# Patient Record
Sex: MALE | Race: WHITE | Employment: OTHER | URBAN - METROPOLITAN AREA
[De-identification: names, ages, dates, MRNs, and addresses within clinical notes are randomized per-mention and may not be internally consistent; named-entity substitution may affect disease eponyms.]

---

## 2017-06-14 ENCOUNTER — OFFICE VISIT (OUTPATIENT)
Dept: CARDIOLOGY CLINIC | Age: 62
End: 2017-06-14

## 2017-06-14 VITALS
DIASTOLIC BLOOD PRESSURE: 60 MMHG | HEART RATE: 66 BPM | SYSTOLIC BLOOD PRESSURE: 124 MMHG | BODY MASS INDEX: 32.62 KG/M2 | HEIGHT: 71 IN | WEIGHT: 233 LBS

## 2017-06-14 DIAGNOSIS — R00.2 PALPITATION: ICD-10-CM

## 2017-06-14 DIAGNOSIS — Z82.49 FH: CAD (CORONARY ARTERY DISEASE): ICD-10-CM

## 2017-06-14 DIAGNOSIS — E78.5 HYPERLIPIDEMIA, UNSPECIFIED HYPERLIPIDEMIA TYPE: Primary | ICD-10-CM

## 2017-06-14 DIAGNOSIS — I25.10 CORONARY ARTERY DISEASE INVOLVING NATIVE CORONARY ARTERY OF NATIVE HEART WITHOUT ANGINA PECTORIS: ICD-10-CM

## 2017-06-14 PROCEDURE — 3017F COLORECTAL CA SCREEN DOC REV: CPT | Performed by: INTERNAL MEDICINE

## 2017-06-14 PROCEDURE — G8598 ASA/ANTIPLAT THER USED: HCPCS | Performed by: INTERNAL MEDICINE

## 2017-06-14 PROCEDURE — 99214 OFFICE O/P EST MOD 30 MIN: CPT | Performed by: INTERNAL MEDICINE

## 2017-06-14 PROCEDURE — G8427 DOCREV CUR MEDS BY ELIG CLIN: HCPCS | Performed by: INTERNAL MEDICINE

## 2017-06-14 PROCEDURE — G8419 CALC BMI OUT NRM PARAM NOF/U: HCPCS | Performed by: INTERNAL MEDICINE

## 2017-06-14 PROCEDURE — 1036F TOBACCO NON-USER: CPT | Performed by: INTERNAL MEDICINE

## 2018-07-25 ENCOUNTER — OFFICE VISIT (OUTPATIENT)
Dept: CARDIOLOGY CLINIC | Age: 63
End: 2018-07-25

## 2018-07-25 VITALS
DIASTOLIC BLOOD PRESSURE: 84 MMHG | SYSTOLIC BLOOD PRESSURE: 136 MMHG | WEIGHT: 231 LBS | BODY MASS INDEX: 32.34 KG/M2 | HEART RATE: 62 BPM | HEIGHT: 71 IN

## 2018-07-25 DIAGNOSIS — R00.2 PALPITATION: Primary | ICD-10-CM

## 2018-07-25 DIAGNOSIS — I25.10 CORONARY ARTERY DISEASE INVOLVING NATIVE CORONARY ARTERY OF NATIVE HEART WITHOUT ANGINA PECTORIS: ICD-10-CM

## 2018-07-25 PROCEDURE — 3017F COLORECTAL CA SCREEN DOC REV: CPT | Performed by: INTERNAL MEDICINE

## 2018-07-25 PROCEDURE — 1036F TOBACCO NON-USER: CPT | Performed by: INTERNAL MEDICINE

## 2018-07-25 PROCEDURE — 99214 OFFICE O/P EST MOD 30 MIN: CPT | Performed by: INTERNAL MEDICINE

## 2018-07-25 PROCEDURE — 93000 ELECTROCARDIOGRAM COMPLETE: CPT | Performed by: INTERNAL MEDICINE

## 2018-07-25 PROCEDURE — G8417 CALC BMI ABV UP PARAM F/U: HCPCS | Performed by: INTERNAL MEDICINE

## 2018-07-25 PROCEDURE — G8427 DOCREV CUR MEDS BY ELIG CLIN: HCPCS | Performed by: INTERNAL MEDICINE

## 2018-07-25 PROCEDURE — G8598 ASA/ANTIPLAT THER USED: HCPCS | Performed by: INTERNAL MEDICINE

## 2018-07-25 NOTE — PROGRESS NOTES
CARDIOLOGY NOTE      7/25/2018    RE: Shannan Carbone  ([birth year])                               TO:  Dr. Nico Fall MD      Thank you for involving me in taking care of your  patient Shannan Carbone, who is a  59y.o. year old      male with past medical  history of  Cad, hyperlipidimea  is  seen today Patient  during this  visit c/o more fluttering in chest than usual , no CP, SOB, for a few months. .      Vitals:    07/25/18 0820   BP: 136/84   Pulse: 62       Current Outpatient Prescriptions   Medication Sig Dispense Refill    escitalopram (LEXAPRO) 10 MG tablet Take 10 mg by mouth daily.  rosuvastatin (CRESTOR) 20 MG tablet Take 1 tablet by mouth daily. 90 tablet 3    aspirin 325 MG tablet Take 325 mg by mouth daily. No current facility-administered medications for this visit. Allergies: Patient has no known allergies. Past Medical History:   Diagnosis Date    CAD (coronary artery disease) 10/08    Per cardiac cath.     H/O 24 hour EKG monitoring 7/18/12    WNL    H/O cardiac catheterization 10/08    Moderate disease of the distal LAD     H/O cardiovascular stress test 12/2/10    WNL EF 63%    H/O echocardiogram 12/1/09    WNL EF 55%    H/O echocardiogram 04/04/2014    EF 60%, trace mitral and tricuspid insufficiencies, estimated RV systolic pressure is 33.6 mmHg, no pericardial effusion    H/O: depression     Hyperlipidemia     Palpitation     Sleep apnea      Past Surgical History:   Procedure Laterality Date    CARPAL TUNNEL RELEASE  3/08    COLONOSCOPY  4-26-13    diverticulosis    COLONOSCOPY  2013    DIAGNOSTIC CARDIAC CATH LAB PROCEDURE  10/07/08    Moderate disease of the distal LAD    TONSILLECTOMY       Family History   Problem Relation Age of Onset    Cancer Father     Heart Disease Brother      Social History   Substance Use Topics    Smoking status: Never Smoker    Smokeless tobacco: Never Used      Comment: reviewed 5/22/15    Alcohol use

## 2018-07-26 ENCOUNTER — NURSE ONLY (OUTPATIENT)
Dept: CARDIOLOGY CLINIC | Age: 63
End: 2018-07-26

## 2018-07-26 DIAGNOSIS — R00.2 PALPITATIONS: Primary | ICD-10-CM

## 2018-07-26 PROCEDURE — 93228 REMOTE 30 DAY ECG REV/REPORT: CPT | Performed by: INTERNAL MEDICINE

## 2018-08-13 ENCOUNTER — PROCEDURE VISIT (OUTPATIENT)
Dept: CARDIOLOGY CLINIC | Age: 63
End: 2018-08-13

## 2018-08-13 DIAGNOSIS — I25.10 CORONARY ARTERY DISEASE INVOLVING NATIVE CORONARY ARTERY OF NATIVE HEART WITHOUT ANGINA PECTORIS: ICD-10-CM

## 2018-08-13 DIAGNOSIS — R00.2 PALPITATION: ICD-10-CM

## 2018-08-13 PROCEDURE — 93015 CV STRESS TEST SUPVJ I&R: CPT | Performed by: INTERNAL MEDICINE

## 2018-08-17 DIAGNOSIS — I25.10 CORONARY ARTERY DISEASE INVOLVING NATIVE CORONARY ARTERY OF NATIVE HEART WITHOUT ANGINA PECTORIS: ICD-10-CM

## 2018-08-17 DIAGNOSIS — R00.2 PALPITATION: ICD-10-CM

## 2019-06-13 LAB
ALBUMIN SERPL-MCNC: 4.8 G/DL
ALP BLD-CCNC: 83 U/L
ALT SERPL-CCNC: 39 U/L
ANION GAP SERPL CALCULATED.3IONS-SCNC: NORMAL MMOL/L
AST SERPL-CCNC: 32 U/L
BASOPHILS ABSOLUTE: 0.1 /ΜL
BASOPHILS RELATIVE PERCENT: 1 %
BILIRUB SERPL-MCNC: 0.9 MG/DL (ref 0.1–1.4)
BUN BLDV-MCNC: 18 MG/DL
CALCIUM SERPL-MCNC: 9.9 MG/DL
CHLORIDE BLD-SCNC: 103 MMOL/L
CHOLESTEROL, TOTAL: 180 MG/DL
CHOLESTEROL/HDL RATIO: NORMAL
CO2: 21 MMOL/L
CREAT SERPL-MCNC: 0.98 MG/DL
EOSINOPHILS ABSOLUTE: 0.3 /ΜL
EOSINOPHILS RELATIVE PERCENT: 4 %
GFR CALCULATED: NORMAL
GLUCOSE BLD-MCNC: 106 MG/DL
HCT VFR BLD CALC: 44.6 % (ref 41–53)
HDLC SERPL-MCNC: 43 MG/DL (ref 35–70)
HEMOGLOBIN: 15.1 G/DL (ref 13.5–17.5)
LDL CHOLESTEROL CALCULATED: 91 MG/DL (ref 0–160)
LYMPHOCYTES ABSOLUTE: 1.9 /ΜL
LYMPHOCYTES RELATIVE PERCENT: 20 %
MCH RBC QN AUTO: 27.9 PG
MCHC RBC AUTO-ENTMCNC: 33.9 G/DL
MCV RBC AUTO: 82 FL
MONOCYTES ABSOLUTE: 0.6 /ΜL
MONOCYTES RELATIVE PERCENT: 6 %
NEUTROPHILS ABSOLUTE: 6.6 /ΜL
NEUTROPHILS RELATIVE PERCENT: 69 %
PLATELET # BLD: 275 K/ΜL
PMV BLD AUTO: NORMAL FL
POTASSIUM SERPL-SCNC: 4.6 MMOL/L
RBC # BLD: 5.42 10^6/ΜL
SODIUM BLD-SCNC: 138 MMOL/L
TOTAL PROTEIN: 7.7
TRIGL SERPL-MCNC: 230 MG/DL
VLDLC SERPL CALC-MCNC: 46 MG/DL
WBC # BLD: 9.5 10^3/ML

## 2019-07-24 ENCOUNTER — OFFICE VISIT (OUTPATIENT)
Dept: CARDIOLOGY CLINIC | Age: 64
End: 2019-07-24
Payer: COMMERCIAL

## 2019-07-24 VITALS
HEART RATE: 62 BPM | DIASTOLIC BLOOD PRESSURE: 78 MMHG | SYSTOLIC BLOOD PRESSURE: 116 MMHG | BODY MASS INDEX: 32.51 KG/M2 | HEIGHT: 71 IN | WEIGHT: 232.2 LBS

## 2019-07-24 DIAGNOSIS — E78.5 HYPERLIPIDEMIA, UNSPECIFIED HYPERLIPIDEMIA TYPE: Primary | ICD-10-CM

## 2019-07-24 DIAGNOSIS — R00.2 PALPITATION: ICD-10-CM

## 2019-07-24 DIAGNOSIS — I25.10 CORONARY ARTERY DISEASE INVOLVING NATIVE CORONARY ARTERY OF NATIVE HEART WITHOUT ANGINA PECTORIS: ICD-10-CM

## 2019-07-24 PROCEDURE — 99212 OFFICE O/P EST SF 10 MIN: CPT | Performed by: NURSE PRACTITIONER

## 2019-07-24 ASSESSMENT — ENCOUNTER SYMPTOMS
ABDOMINAL DISTENTION: 0
VOMITING: 0
COLOR CHANGE: 0
EYE ITCHING: 0
DIARRHEA: 0
BACK PAIN: 0
SORE THROAT: 0
SHORTNESS OF BREATH: 0
CONSTIPATION: 0
EYE REDNESS: 0
SINUS PRESSURE: 0
NAUSEA: 0
ABDOMINAL PAIN: 0
CHEST TIGHTNESS: 0

## 2020-03-12 ENCOUNTER — HOSPITAL ENCOUNTER (OUTPATIENT)
Dept: DIABETES SERVICES | Age: 65
Setting detail: THERAPIES SERIES
Discharge: HOME | End: 2020-03-12
Payer: COMMERCIAL

## 2020-03-12 PROCEDURE — G0109 DIAB MANAGE TRN IND/GROUP: HCPCS

## 2020-03-12 SDOH — ECONOMIC STABILITY: FOOD INSECURITY: ADDITIONAL INFORMATION: NO

## 2020-03-13 VITALS — HEIGHT: 71 IN | WEIGHT: 232 LBS | BODY MASS INDEX: 32.48 KG/M2

## 2020-03-13 NOTE — PROGRESS NOTES
proper technique; safe needle disposal guidelines. 03-  1 03- 03-  3 Diet control   Monitoring blood glucose, interpreting and using results:  Identify recommended & personal blood glucose targets; importance of testing; testing supplies; HgbA1C target levels; Factors affecting blood glucose; Importance of logging blood glucose levels for pattern recognition; ketone testing; safe lancet disposal. 03-  1 03- 03-  3 No results found for: LABA1C   Monitors 3 times a week   -140   Prevention, detection & treatment of acute complications:  Identify symptoms of hyper & hypoglycemia, ketosis and prevention & treatment strategies. Describe sick day guidelines & indications for ketone testing & physician notification. Identify short term consequences of poor control. Severe weather or situation crisis and diabetes supplies management. 03-  1 03- 03-  3 Denies symptoms of low or high BG   Prevention, detection & treatment of chronic complications:  Define the natural course of diabetes & describe the relationship of blood glucose levels to long term complications of diabetes. Identify preventative measures & standards of care. Immunizations and preventative eye, foot, dental and renal examinations as indicated per the individual participant's duration of Diabetes and health status. 03-  1 03- 03-  3 Needs eye exam and not doing daily foot exam  Dental every 6 month   Developing strategies to address psychosocial issues:  Describe feelings about living with diabetes; Describe how stress, depression & anxiety affect blood glucose; Identify coping strategies; Identify support needed & support network available. 03-  1 03- 03-  3 Plans to FU with PCP about depression   Developing strategies to promote health/change behavior: Identify 7 self-care behaviors; Personal health risk factors;  Benefits,

## 2020-07-22 ENCOUNTER — TELEPHONE (OUTPATIENT)
Dept: CARDIOLOGY CLINIC | Age: 65
End: 2020-07-22

## 2020-07-27 ENCOUNTER — TELEMEDICINE (OUTPATIENT)
Dept: CARDIOLOGY CLINIC | Age: 65
End: 2020-07-27
Payer: COMMERCIAL

## 2020-07-27 PROCEDURE — 3017F COLORECTAL CA SCREEN DOC REV: CPT | Performed by: NURSE PRACTITIONER

## 2020-07-27 PROCEDURE — G8427 DOCREV CUR MEDS BY ELIG CLIN: HCPCS | Performed by: NURSE PRACTITIONER

## 2020-07-27 PROCEDURE — 99212 OFFICE O/P EST SF 10 MIN: CPT | Performed by: NURSE PRACTITIONER

## 2020-07-27 ASSESSMENT — ENCOUNTER SYMPTOMS
COLOR CHANGE: 0
SINUS PAIN: 0
EYE ITCHING: 0
CHEST TIGHTNESS: 0
SHORTNESS OF BREATH: 0
SINUS PRESSURE: 0
ABDOMINAL DISTENTION: 0
DIARRHEA: 0
ABDOMINAL PAIN: 0
BACK PAIN: 0
BLOOD IN STOOL: 0
VOMITING: 0
NAUSEA: 0
CONSTIPATION: 0
EYE REDNESS: 1

## 2020-07-27 NOTE — PROGRESS NOTES
2020    TELEHEALTH EVALUATION -- Audio/Visual (During BZFJD-30 public health emergency)    HPI:     Yulisa Grimaldo (:  1955) has requested an audio/video evaluation for the following concern(s):  History of coronary artery disease and hyperlipidemia. Patient reports he has been feeling well since his last office visit 1 year ago. He reports there has been no change in his health history. He reports that he is very active. He exercises daily. He recently started the keto diet. He has lost 4 pounds. He denies chest pain palpitations shortness of breath lightheadedness dizziness syncope or edema. He is able to take his blood pressure at home. He has had labs drawn with Dr. Napoleon Quintero within the last 6 months. He sees Dr. Napoleon Quintero 2 times a year. Review of Systems   Constitutional: Negative for activity change, appetite change and fatigue. HENT: Negative for congestion, sinus pressure and sinus pain. Eyes: Positive for redness. Negative for itching. Respiratory: Negative for chest tightness and shortness of breath. Cardiovascular: Negative for chest pain, palpitations and leg swelling. Gastrointestinal: Negative for abdominal distention, abdominal pain, blood in stool, constipation, diarrhea, nausea and vomiting. Genitourinary: Negative for difficulty urinating and dysuria. Musculoskeletal: Positive for arthralgias. Negative for back pain and gait problem. Skin: Negative for color change, pallor and rash. Neurological: Negative for dizziness, syncope, speech difficulty and light-headedness. Psychiatric/Behavioral: Negative for confusion. The patient is not nervous/anxious. Prior to Visit Medications    Medication Sig Taking? Authorizing Provider   escitalopram (LEXAPRO) 10 MG tablet Take 10 mg by mouth daily. Yes Historical Provider, MD   rosuvastatin (CRESTOR) 20 MG tablet Take 1 tablet by mouth daily.  Yes Crow Gaytan MD   aspirin 325 MG tablet Take 325 mg by mouth daily. Yes Historical Provider, MD       Social History     Tobacco Use    Smoking status: Never Smoker    Smokeless tobacco: Never Used    Tobacco comment: reviewed 5/22/15   Substance Use Topics    Alcohol use: No    Drug use: No          Patient-Reported Vitals 7/27/2020   Patient-Reported Weight 117lb   Patient-Reported Height 5'11   Patient-Reported Systolic 222   Patient-Reported Diastolic 79   Patient-Reported Pulse 77       PHYSICAL EXAMINATION:  [ INSTRUCTIONS:  \"[x]\" Indicates a positive item  \"[]\" Indicates a negative item  -- DELETE ALL ITEMS NOT EXAMINED]  Vital Signs: (As obtained by patient/caregiver or practitioner observation)  Patient-Reported Vitals 7/27/2020   Patient-Reported Weight 117lb   Patient-Reported Height 5'11   Patient-Reported Systolic 620   Patient-Reported Diastolic 79   Patient-Reported Pulse 77         Constitutional: [x] Appears well-developed and well-nourished [x] No apparent distress      [] Abnormal-   Mental status  [x] Alert and awake  [x] Oriented to person/place/time [x]Able to follow commands      Eyes:  EOM    [x]  Normal  [] Abnormal-  Sclera  [x]  Normal  [] Abnormal -         Discharge [x]  None visible  [] Abnormal -    HENT:   [x] Normocephalic, atraumatic.   [] Abnormal   [x] Mouth/Throat: Mucous membranes are moist.     External Ears [x] Normal  [] Abnormal-     Neck: [x] No visualized mass     Pulmonary/Chest: [x] Respiratory effort normal.  [x] No visualized signs of difficulty breathing or respiratory distress        [] Abnormal-      Musculoskeletal:   [x] Normal gait with no signs of ataxia         [x] Normal range of motion of neck        [] Abnormal-       Neurological:        [x] No Facial Asymmetry (Cranial nerve 7 motor function) (limited exam to video visit)          [x] No gaze palsy        [] Abnormal-         Skin:        [x] No significant exanthematous lesions or discoloration noted on facial skin         [] Abnormal- Psychiatric:       [x] Normal Affect [x] No Hallucinations        [] Abnormal-     Other pertinent observable physical exam findings-     Due to this being a TeleHealth encounter, evaluation of the following organ systems is limited: Vitals/Constitutional/EENT/Resp/CV/GI//MS/Neuro/Skin/Heme-Lymph-Imm. ASSESSMENT/PLAN:    CAD  Stable  Denies cardiac complaints  Last stress test 8/13/2018 reviewed  No ischemia noted  No change in current medical management  Blood pressure stable    Hyperlipidemia  He is on Crestor  He does follow with Dr. Luis Enrique Hernandez office every 6 months  He states he recently had labs  We will attempt to obtain labs from Dr. Luis Enrique Hernandez office    Palpitations  Resolved    Recommend patient to follow-up with Dr. Maxim Ray in 1 year      An  electronic signature was used to authenticate this note. --KENYA Green - CNP on 7/27/2020 at 9:03 AM    119}    I confirm that this visit was completed in a telehealth setting ,using synchronous audiovisual technology for real time patient interaction . The patient identity with name and date of birth was confirmed . This evaluation of patient was done by telehealth in the setting of [address] , which precluded assurance of safe in person visit at the time of service. The patient consented to and accepts potential risks associated with telemedical evaluation and care was taken to assess alvarez presence of any medical issues that would be more  appropriate for expedited in -person care. Pursuant to the emergency declaration under the [facility] waiver authority and the [employer] Act, this Virtual  Visit was conducted, with patient's consent, to reduce the patient's risk of exposure to COVID-19 and provide continuity of care for an established patient.     Services were provided through a video synchronous discussion virtually to substitute for in-person clinic visit. I Affirm this is a Patient Initiated Episode with an Established Patient who has not had a related appointment within my department in the past 7 days or scheduled within the next 24 hours.     Total Time: minutes: 5-10 minutes

## 2020-07-27 NOTE — LETTER
Jocelin Reid  1955  [id number]    Have you had any Chest Pain - No      Have you had any Shortness of Breath - No      Have you had any dizziness - No      Have you had any palpitations - NO      Is the patient on any of the following medications - none  If Yes DO EKG    Do you have any edema - no    Do you have a surgery or procedure scheduled in the near future - No      Ask patient if they [facility] to [facility] if they are not already signed up    Check to see if we have an E-MAIL on file for the patient    Check medication list thoroughly!!!  BE SURE TO ASK PATIENT IF THEY NEED MEDICATION REFILLS

## 2021-06-30 ENCOUNTER — OFFICE VISIT (OUTPATIENT)
Dept: CARDIOLOGY CLINIC | Age: 66
End: 2021-06-30
Payer: COMMERCIAL

## 2021-06-30 VITALS
BODY MASS INDEX: 32.81 KG/M2 | HEART RATE: 72 BPM | WEIGHT: 234.4 LBS | SYSTOLIC BLOOD PRESSURE: 120 MMHG | DIASTOLIC BLOOD PRESSURE: 82 MMHG | HEIGHT: 71 IN

## 2021-06-30 DIAGNOSIS — I25.10 CORONARY ARTERY DISEASE INVOLVING NATIVE CORONARY ARTERY OF NATIVE HEART WITHOUT ANGINA PECTORIS: Primary | ICD-10-CM

## 2021-06-30 DIAGNOSIS — E78.5 HYPERLIPIDEMIA, UNSPECIFIED HYPERLIPIDEMIA TYPE: ICD-10-CM

## 2021-06-30 PROCEDURE — 99203 OFFICE O/P NEW LOW 30 MIN: CPT | Performed by: NURSE PRACTITIONER

## 2021-06-30 PROCEDURE — 93000 ELECTROCARDIOGRAM COMPLETE: CPT | Performed by: NURSE PRACTITIONER

## 2021-06-30 ASSESSMENT — ENCOUNTER SYMPTOMS
ORTHOPNEA: 0
SHORTNESS OF BREATH: 0

## 2021-06-30 NOTE — LETTER
Barbara Turk  [birth year]  [id number]    Have you had any Chest Pain that is not new? - No         Have you had any Shortness of Breath - No  If Yes - When       Have you had any dizziness - No       Have you had any palpitations that are not new? - No       Do you have any edema - swelling in No    If Yes - CHECK TO SEE IF THE EDEMA IS PITTING      When did you have your last labs drawn 3/2021  Where did you have them done Dr. Je Pemberton  What doctor ordered Dr. Je Pemberton    If we do not have these labs you are retrieve these labs for these providers!     Do you have a surgery or procedure scheduled in the near future - No  If Yes- DO EKG

## 2021-06-30 NOTE — PROGRESS NOTES
6/30/2021  Primary cardiologist: Dr. Kimi Price  is an established 73 y.o.  male here for follow-up on coronary artery disease and hyperlipidemia       SUBJECTIVE/OBJECTIVE:  HPI : Karen Goetz is a 43-year-old gentleman with a past medical history of CAD, hyperlipidemia, sleep apnea and palpitations. In 2008 he underwent a LHC that demonstrated moderate disease of the LAD. Karen Goetz reports he is feeling well. He retired in November and is enjoying life. He is hiking three times a week. He denies chest pain or shortness of breath. Review of Systems   Constitutional: Negative for diaphoresis and malaise/fatigue. Cardiovascular: Negative for chest pain, claudication, dyspnea on exertion, irregular heartbeat, leg swelling, near-syncope, orthopnea, palpitations and paroxysmal nocturnal dyspnea. Respiratory: Negative for shortness of breath. Neurological: Negative for dizziness and light-headedness. Vitals:    06/30/21 1026   BP: 120/82   Site: Right Upper Arm   Position: Sitting   Cuff Size: Large Adult   Pulse: 72   Weight: 234 lb 6.4 oz (106.3 kg)   Height: 5' 11\" (1.803 m)     Patient-Reported Vitals 7/27/2020   Patient-Reported Weight 117lb   Patient-Reported Height 5'11   Patient-Reported Systolic 563   Patient-Reported Diastolic 79   Patient-Reported Pulse 77     Wt Readings from Last 3 Encounters:   06/30/21 234 lb 6.4 oz (106.3 kg)   03/13/20 232 lb (105.2 kg)   07/24/19 232 lb 3.2 oz (105.3 kg)     Body mass index is 32.69 kg/m². Physical Exam  Constitutional:       Appearance: Normal appearance. HENT:      Head: Normocephalic and atraumatic. Eyes:      Extraocular Movements: Extraocular movements intact. Pupils: Pupils are equal, round, and reactive to light. Cardiovascular:      Rate and Rhythm: Normal rate and regular rhythm. Pulses: Normal pulses. Pulmonary:      Effort: Pulmonary effort is normal.      Breath sounds: Normal breath sounds.    Abdominal: Joint Injections: Care Instructions Your Care Instructions Joint injections are shots into a joint, such as the knee. They may be used to put in medicines, such as pain relievers. A corticosteroid, or steroid, shot is used to reduce inflammation in tendons or joints. It is often used to treat problems such as arthritis, tendinitis, and bursitis. Steroids can be injected directly into a painful, inflamed joint. They can also help reduce inflammation of a bursa. A bursa is a sac of fluid. It cushions and lubricates areas where tendons, ligaments, skin, muscles, or bones rub against each other. A steroid shot can sometimes help with short-term pain relief when other treatments haven't worked. If steroid shots help, pain may improve for weeks or months. Follow-up care is a key part of your treatment and safety. Be sure to make and go to all appointments, and call your doctor if you are having problems. It's also a good idea to know your test results and keep a list of the medicines you take. How can you care for yourself at home? · Put ice or a cold pack on the area for 10 to 20 minutes at a time. Put a thin cloth between the ice and your skin. · Ask your doctor if you can take an over-the-counter pain medicine, such as acetaminophen (Tylenol), ibuprofen (Advil, Motrin), or naproxen (Aleve). Be safe with medicines. Read and follow all instructions on the label. · Avoid strenuous activities for several days. In particular, avoid ones that put stress on the area where you got the shot. · If you have dressings over the area, keep them clean and dry. You may remove them when your doctor tells you to. When should you call for help? Call your doctor now or seek immediate medical care if: 
  · You have signs of infection, such as: 
? Increased pain, swelling, warmth, or redness. ? Red streaks leading from the site. ? Pus draining from the site. ? A fever. General: There is no distension. Palpations: Abdomen is soft. Tenderness: There is no abdominal tenderness. Musculoskeletal:         General: Normal range of motion. Cervical back: Normal range of motion and neck supple. Right lower leg: No edema. Left lower leg: No edema. Skin:     General: Skin is warm. Capillary Refill: Capillary refill takes less than 2 seconds. Neurological:      General: No focal deficit present. Mental Status: He is alert and oriented to person, place, and time. Psychiatric:         Mood and Affect: Mood normal.         Behavior: Behavior normal.            All pertinent data reviewed and discussed with patient          ASSESSMENT/PLAN:    Coronary artery disease  Moderate disease of LAD   Clinically stable- denies any symptoms from CAD   Discussed aggressive risk factor modification including diet, good blood pressure control, and lipid management  A diet emphasizing intake of vegetables, fruits, legumes, nuts, whole grains, and fish is recommended to decrease ASCVD risk factors. Recommend to continue with ASA  EKG SR -    Dyslipidemia   No recent lipids available - has stopped crestor d/t myalgia   Goals for lipids reviewed  Discussed healthy eating habits including low fat -low cholesterol diet  continue to engage in healthy eating and exercise      Medications reviewed and confirmed with patient     Tests ordered:  none    OV in 1 year     Signed:  KENYA Lafleur CNP, 6/30/2021, 10:32 AM    An electronic signature was used to authenticate this note.  Watch closely for changes in your health, and be sure to contact your doctor if you have any problems. Where can you learn more? Go to http://elijah-reagan.info/. Enter N616 in the search box to learn more about \"Joint Injections: Care Instructions. \" Current as of: November 29, 2017 Content Version: 11.8 © 1121-9370 [employer]. Care instructions adapted under license by [employer] (which disclaims liability or warranty for this information). If you have questions about a medical condition or this instruction, always ask your healthcare professional. [address] any warranty or liability for your use of this information.

## 2021-12-02 ENCOUNTER — HOSPITAL ENCOUNTER (OUTPATIENT)
Dept: PHYSICAL THERAPY | Age: 66
Setting detail: THERAPIES SERIES
Discharge: HOME | End: 2021-12-02
Payer: COMMERCIAL

## 2021-12-02 PROCEDURE — 97110 THERAPEUTIC EXERCISES: CPT

## 2021-12-02 PROCEDURE — 97162 PT EVAL MOD COMPLEX 30 MIN: CPT

## 2021-12-02 PROCEDURE — 97012 MECHANICAL TRACTION THERAPY: CPT

## 2021-12-02 ASSESSMENT — PAIN DESCRIPTION - FREQUENCY: FREQUENCY: CONTINUOUS

## 2021-12-02 ASSESSMENT — PAIN DESCRIPTION - PAIN TYPE: TYPE: CHRONIC PAIN

## 2021-12-02 ASSESSMENT — PAIN SCALES - GENERAL: PAINLEVEL_OUTOF10: 3

## 2021-12-02 ASSESSMENT — PAIN DESCRIPTION - LOCATION: LOCATION: BACK

## 2021-12-02 ASSESSMENT — PAIN DESCRIPTION - DIRECTION: RADIATING_TOWARDS: L4-5

## 2021-12-02 ASSESSMENT — PAIN DESCRIPTION - ONSET: ONSET: GRADUAL

## 2021-12-02 ASSESSMENT — PAIN - FUNCTIONAL ASSESSMENT: PAIN_FUNCTIONAL_ASSESSMENT: PREVENTS OR INTERFERES SOME ACTIVE ACTIVITIES AND ADLS

## 2021-12-02 ASSESSMENT — PAIN DESCRIPTION - PROGRESSION: CLINICAL_PROGRESSION: NOT CHANGED

## 2021-12-02 ASSESSMENT — PAIN DESCRIPTION - ORIENTATION: ORIENTATION: RIGHT

## 2021-12-02 ASSESSMENT — PAIN DESCRIPTION - DESCRIPTORS: DESCRIPTORS: STABBING;SHARP

## 2021-12-02 NOTE — PLAN OF CARE
Outpatient Physical Therapy           [facility]           [] Phone: [phone]   Fax: [phone]  [facility]           [] Phone: [phone]   Fax: [phone]     To: Referring Practitioner: Simón Flores PA-C    From: Steven Quezada, PT, DPT, OCS      Patient: Nata Ritter         : 1955  Diagnosis: Diagnosis: L DDD, L4-5 Radiculopathy   Treatment Diagnosis: Treatment Diagnosis: Back/R LE pain, weakness, reduced lumbar ROM   Date: 2021    Physical Therapy Certification/Re-Certification Form  Dear Simón Flores PA-C  The following patient has been evaluated for physical therapy services and for therapy to continue, insurance requires physician review of the treatment plan initially and every 90 days. Please review the attached evaluation and/or summary of the patient's plan of care, and verify that you agree therapy should continue by signing the attached document and sending it back to our office. Assessment:      Pt is 73year old male with 1 month sudden onset of low back with R LE radiating pain. Pt now has difficulties completing general mobility, sleeping and ADLs. Pt demo deficits this date that include reduced lumbar AR OM, R LE strengthening and radiating pain. Testing this date indicate signs and symptoms of lumbar radiculopathy. Pt will benefit with PT services with progression of strength/ROM, manual, mechanical traction and modalities to return to PLOF. Pt prior to onset of current condition had no pain with able to complete full ADLs. Patient received education on their current pathology and how their condition effects them with their functional activities. Patient understood discussion and questions were answered. Patient understands their activity limitations and understands rational for treatment progression.     Plan of Care/Treatment to date:  [x] Therapeutic Exercise  [x] Modalities:  [x] Therapeutic Activity     [] Ultrasound  [x] Electrical Stimulation  [x] Gait Training      [] Cervical Traction [x] Lumbar Traction  [x] Neuromuscular Re-education    [x] Cold/hotpack [] Iontophoresis   [x] Instruction in HEP      [] Vasopneumatic    [] Dry Needling  [x] Manual Therapy               [] Aquatic Therapy       Other:          Frequency/Duration:  # Days per week: [x] 1 day # Weeks: [] 1 week [x] 5 weeks     [x] 2 days   [] 2 weeks [] 6 weeks     [] 3 days   [] 3 weeks [] 7 weeks     [] 4 days   [] 4 weeks [] 8 weeks         [] 9 weeks [] 10 weeks         [] 11 weeks [] 12 weeks    Rehab Potential/Progress: [] Excellent [x] Good [] Fair  [] Poor     Goals:    Patient goals : return to normal mobility and ADLs without pain  Short term goals  Time Frame for Short term goals: Defer to [address] term goals  Time Frame for Long term goals : 5 weeks 1/8/22  Long term goal 1: Pt will demo I with HEP/symptom management. Long term goal 2: Pt will demo normal gait mechanics without increase in back/leg pain with 6MWT. Long term goal 3: Pt will demo 5/5 R LE strength to decrease fall risk. Long term goal 4: Pt will report <20% disability per [employer]. Long term goal 5: Pt will demo full lumbar AROM with <1/10 pain to ease ADLs. Electronically signed by:  Charolet Hamman, PT, DPT, OCS  12/2/2021, 9:48 AM    12/2/2021 9:48 AM         If you have any questions or concerns, please don't hesitate to call.   Thank you for your referral.      Physician Signature:________________________________Date:_________ TIME: _____  By signing above, therapists plan is approved by physician

## 2021-12-02 NOTE — PROGRESS NOTES
Physical Therapy  Initial Assessment  Date: 2021  Patient Name: Payton Tavarez  MRN: [id number]  : 1955     Treatment Diagnosis: Back/R LE pain, weakness, reduced lumbar ROM    Restrictions       Subjective   General  Chart Reviewed: Yes  Patient assessed for rehabilitation services?: Yes  Referring Practitioner: Miroslava Bates PA-C  Diagnosis: L DDD, L4-5 Radiculopathy  PT Visit Information  PT Insurance Information: [facility]  Subjective  Subjective: Oct 22, 2021 with out fishing with lifting the boat up with symptoms the next day. No symptoms prior. Taken meds issued with improvement with worsening without and now on last dose of second session of meds. Has more leg pain than back pain with minimal back pain. Pain mostly into lateral hip, anterior thigh and shin region. N/T only into anterior shin. Few times with leg giving out on him with stumble. Position of comfort with knee/hip flexed. Unable to sleep in bed with sleeping in recliner. Everything is painful. MRI completed with Severe at L5-S1 and mod severe at L4-5. Referral to PT. Return follow up on 21. Pain Screening  Patient Currently in Pain: Yes  Pain Assessment  Pain Assessment: 0-10  Pain Level: 3 (Best: 2/10     Worst: 10/10 without medication.)  Patient's Stated Pain Goal: No pain  Pain Type: Chronic pain  Pain Location: Back  Pain Orientation: Right  Pain Radiating Towards: L4-5  Pain Descriptors: Stabbing;  Sharp  Pain Frequency: Continuous  Pain Onset: Gradual  Clinical Progression: Not changed  Functional Pain Assessment: Prevents or interferes some active activities and ADLs  Vital Signs  Patient Currently in Pain: Yes    Vision/Hearing  Vision  Vision: Within Functional Limits  Hearing  Hearing: Within functional limits    Orientation  Orientation  Overall Orientation Status: Within Normal Limits    Social/Functional History  Social/Functional History  Lives With: Spouse  Type of Home: House  Home Layout: One level  Home Access: Stairs to enter without rails  Entrance Stairs - Number of Steps: 1  Bathroom Shower/Tub: Walk-in shower  ADL Assistance: Independent  Homemaking Assistance: Independent  Ambulation Assistance: Independent  Transfer Assistance: Independent  Active : Yes  Mode of Transportation: Car  Occupation: Retired  Leisure & Hobbies: hikes, walking    Objective     Observation/Palpation  Palpation: No pain with palpation. Observation: left lean while sitting for comfort. able to stand without UE assisstance. min antalgic gait with decreased R LE stance. PROM RLE (degrees)  RLE PROM: WNL  RLE General PROM: tolerates overpressure into hip flex, limited hip ext secondary to increase in symptoms into R posterior hip. AROM RLE (degrees)  RLE AROM: WNL  RLE General AROM: WFL  PROM LLE (degrees)  LLE PROM: WNL  AROM LLE (degrees)  LLE AROM : WNL  Spine  Lumbar: Flex: to toes with increase in anterior shin pain. Ext: 25% deficit with R hip and LE pain      SB: R SB with pain WFL     L SB:  WFL       B Rot: WFL with R hip pain with ful R Rot. Joint Mobility  Spine: hypomobility noted with min discomfort, limited tolerance with laying prone. Strength RLE  Strength RLE: Exception  Comment: weakness throughout compared to L LE with other directions at 5/5. R Hip Flexion: 4/5  R Hip ABduction: 4/5  R Hip ADduction: 4/5  R Hip External Rotation: 4+/5  R Knee Extension: 4/5  Strength LLE  Strength LLE: WNL  Comment: 5/5 in all directions. Strength Other  Other: 5x sit to stand: 15.34 sec without UE asssitance   3/10 pain in the hip. Tolerated prone laying for 30 sec secondary to increase in back pain. Additional Measures  Other: Oswestry: 40% disability     Balance  Single Leg Stance R Le  Single Leg Stance L Le  Comments: Limited due to pain.      (+)  SLR, Slump      Assessment   Conditions Requiring Skilled Therapeutic Intervention  Body structures, Functions, Activity limitations: Decreased functional mobility ; Decreased ROM; Decreased endurance; Decreased strength; Increased pain  Pt is 73year old male with 1 month sudden onset of low back with R LE radiating pain. Pt now has difficulties completing general mobility, sleeping and ADLs. Pt demo deficits this date that include reduced lumbar AR OM, R LE strengthening and radiating pain. Testing this date indicate signs and symptoms of lumbar radiculopathy. Pt will benefit with PT services with progression of strength/ROM, manual, mechanical traction and modalities to return to PLOF. Pt prior to onset of current condition had no pain with able to complete full ADLs. Patient received education on their current pathology and how their condition effects them with their functional activities. Patient understood discussion and questions were answered. Patient understands their activity limitations and understands rational for treatment progression. Treatment Diagnosis: Back/R LE pain, weakness, reduced lumbar ROM  Prognosis: Good  Decision Making: Medium Complexity  Barriers to Learning: None  REQUIRES PT FOLLOW UP: Yes  Activity Tolerance  Activity Tolerance: Patient Tolerated treatment well         Plan   Plan  Times per week: 1-2  Plan weeks: 5  Specific instructions for Next Treatment: review HEP, flexion based program, nerve glides, piriformis stretching, R LE strengthening, man R LE traction, mech traction to finish treatment  Current Treatment Recommendations: Strengthening, ROM, Neuromuscular Re-education, Home Exercise Program, Manual Therapy - Soft Tissue Mobilization, Modalities       Goals  Short term goals  Time Frame for Short term goals: Defer to [address] term goals  Time Frame for Long term goals : 5 weeks 1/8/22  Long term goal 1: Pt will demo I with HEP/symptom management. Long term goal 2: Pt will demo normal gait mechanics without increase in back/leg pain with 6MWT.   Long term goal 3: Pt will demo 5/5 R LE strength to decrease fall risk. Long term goal 4: Pt will report <20% disability per Oswestry. Long term goal 5: Pt will demo full lumbar AROM with <1/10 pain to ease ADLs.   Patient Goals   Patient goals : return to normal mobility and ADLs without pain        Charolet Hamman, PT, DPT, OCS    12/2/2021 9:43 AM

## 2021-12-02 NOTE — FLOWSHEET NOTE
Outpatient Physical Therapy  [place]           [x] Phone: [phone]   Fax: [phone]  [place]           [] Phone: [phone]   Fax: [phone]        Physical Therapy Daily Treatment Note  Date:  2021    Patient Name:  Robert Quintanilla    :  1955  MRN: [id number]  Restrictions/Precautions:    Diagnosis:   Diagnosis: L DDD, L4-5 Radiculopathy  Date of Injury/Surgery: --  Treatment Diagnosis: Treatment Diagnosis: Back/R LE pain, weakness, reduced lumbar ROM    Insurance/Certification information: PT Insurance Information: [employer]   Referring Physician:  Referring Practitioner: Darcy Bojorquez PA-C  Next Doctor Visit:    Plan of care signed (Y/N):  N, sent 21  Outcome Measure: Oswestry: 40% disability   Visit# / total visits:   5-10 per POC  Pain level: 2/10   Goals:     Patient goals : return to normal mobility and ADLs without pain  Short term goals  Time Frame for Short term goals: Defer to [address] term goals  Time Frame for Long term goals : 5 weeks 22  Long term goal 1: Pt will demo I with HEP/symptom management. Long term goal 2: Pt will demo normal gait mechanics without increase in back/leg pain with 6MWT. Long term goal 3: Pt will demo 5/5 R LE strength to decrease fall risk. Long term goal 4: Pt will report <20% disability per Oswestry. Long term goal 5: Pt will demo full lumbar AROM with <1/10 pain to ease ADLs. Summary of Evaluation:    Pt is 73year old male with 1 month sudden onset of low back with R LE radiating pain. Pt now has difficulties completing general mobility, sleeping and ADLs. Pt demo deficits this date that include reduced lumbar AR OM, R LE strengthening and radiating pain. Testing this date indicate signs and symptoms of lumbar radiculopathy. Pt will benefit with PT services with progression of strength/ROM, manual, mechanical traction and modalities to return to PLOF.  Pt prior to onset of current condition had no pain with able to complete full ADLs. Patient received education on their current pathology and how their condition effects them with their functional activities. Patient understood discussion and questions were answered. Patient understands their activity limitations and understands rational for treatment progression. Subjective:  See debbie         Any changes in Ambulatory Summary Sheet? None        Objective:  See eval   COVID screening questions were asked and patient attested that there had been no contact or symptoms        Exercises: (No more than 4 columns)   Exercise/Equipment 12/2/21  #1 Date Date           WARM UP         Nu step             TABLE      *LTR 10x2  3\"     *SKTC/DKTC 10\"x3     *Sitting piriforms stretch  15\"x4     *Seated sciatic nerve glide  10x2 with 3 ankle pumps     SLR? Supine hip flexor stretch ? ? STANDING                                                     PROPRIOCEPTION                                    MODALITIES      mech traction  15'               Other Therapeutic Activities/Education:  Patient received education on their current pathology and how their condition effects them with their functional activities. Patient understood discussion and questions were answered. Patient understands their activity limitations and understands rational for treatment progression. Home Exercise Program:  HO issued, reviewed and discussed with patient. Pt agreed to comply. Manual Treatments:        Modalities:      mech traction, 120# max, 40# min, 3 steps, 50% speed, 90 sec hold x15'  for pain management. Tolerated well. Communication with other providers:  POC sent 12/2/21      Assessment:  (Response towards treatment session) (Pain Rating)     Pt is 73year old male with 1 month sudden onset of low back with R LE radiating pain. Pt now has difficulties completing general mobility, sleeping and ADLs.  Pt demo deficits this date that include reduced lumbar AR OM, R LE strengthening and radiating pain. Testing this date indicate signs and symptoms of lumbar radiculopathy. Pt will benefit with PT services with progression of strength/ROM, manual, mechanical traction and modalities to return to PLOF. Pt prior to onset of current condition had no pain with able to complete full ADLs. Patient received education on their current pathology and how their condition effects them with their functional activities. Patient understood discussion and questions were answered. Patient understands their activity limitations and understands rational for treatment progression.       Plan for Next Session:  review HEP, flexion based program, nerve glides, piriformis stretching, R LE strengthening, man R LE traction, mech traction to finish treatment      Time In / Time Out:    9226-9835       If [facility] Please Indicate Time In/Out/Total Time  CPT Code Time in Time out Total Time                                                            Total for session             Timed Code/Total Treatment Minutes:  14/75'    14' TE, 1 PT eval, 20' mech traction       Next Progress Note due:  Eval 12/2/21  Visit 10       Plan of Care Interventions:  [x] Therapeutic Exercise  [x] Modalities:  [x] Therapeutic Activity     [] Ultrasound  [x] Estim  [x] Gait Training      [] Cervical Traction [] Lumbar Traction  [x] Neuromuscular Re-education    [x] Cold/hotpack [] Iontophoresis   [x] Instruction in HEP      [] Vasopneumatic   [] Dry Needling    [x] Manual Therapy               [] Aquatic Therapy              Electronically signed by:  Ines Morales, PT, DPT, OCS  12/2/2021, 6:34 AM    12/2/2021 6:34 AM

## 2021-12-03 NOTE — FLOWSHEET NOTE
Patients Plan of Care was received and signed. Signed POC was scanned and placed in the patients chart.     Jt Simpson

## 2021-12-10 ENCOUNTER — HOSPITAL ENCOUNTER (OUTPATIENT)
Dept: PHYSICAL THERAPY | Age: 66
Setting detail: THERAPIES SERIES
Discharge: HOME | End: 2021-12-10
Payer: COMMERCIAL

## 2021-12-10 PROCEDURE — 97112 NEUROMUSCULAR REEDUCATION: CPT

## 2021-12-10 PROCEDURE — 97012 MECHANICAL TRACTION THERAPY: CPT

## 2021-12-10 PROCEDURE — 97110 THERAPEUTIC EXERCISES: CPT

## 2021-12-10 NOTE — FLOWSHEET NOTE
Outpatient Physical Therapy  [place]           [x] Phone: [phone]   Fax: [phone]  [place]           [] Phone: [phone]   Fax: [phone]        Physical Therapy Daily Treatment Note  Date:  12/10/2021    Patient Name:  Shantal Mcnamara    :  1955  MRN: [id number]  Restrictions/Precautions:    Diagnosis:   Diagnosis: L DDD, L4-5 Radiculopathy  Date of Injury/Surgery: --  Treatment Diagnosis: Treatment Diagnosis: Back/R LE pain, weakness, reduced lumbar ROM    Insurance/Certification information: PT Insurance Information: [employer]   Referring Physician:  Referring Practitioner: Citlalli Chadwick PA-C  Next Doctor Visit:    Plan of care signed (Y/N):  N, sent 21  Outcome Measure: Oswestry: 40% disability   Visit# / total visits:  -10 per POC  Pain level: 3-4/10   Goals:     Patient goals : return to normal mobility and ADLs without pain  Short term goals  Time Frame for Short term goals: Defer to [address] [facility] term goals  Time Frame for Long term goals : 5 weeks 22  Long term goal 1: Pt will demo I with HEP/symptom management. Long term goal 2: Pt will demo normal gait mechanics without increase in back/leg pain with 6MWT. Long term goal 3: Pt will demo 5/5 R LE strength to decrease fall risk. Long term goal 4: Pt will report <20% disability per Oswestry. Long term goal 5: Pt will demo full lumbar AROM with <1/10 pain to ease ADLs. Summary of Evaluation:    Pt is 73year old male with 1 month sudden onset of low back with R LE radiating pain. Pt now has difficulties completing general mobility, sleeping and ADLs. Pt demo deficits this date that include reduced lumbar AR OM, R LE strengthening and radiating pain. Testing this date indicate signs and symptoms of lumbar radiculopathy. Pt will benefit with PT services with progression of strength/ROM, manual, mechanical traction and modalities to return to PLOF.  Pt prior to onset of current condition had no pain 12/2/21      Assessment:  (Response towards treatment session) (Pain Rating) instructed pt on fundamental DLS concepts and how they apply to ADL's and the importance of the core muscles with activity. Pt appears to do very well with core activation today, but has numerous \"zingers\" in his right HS muscle. Pt does seem to have short lived relief with manual HS stretching. Pt has increased pain up to 6-7/10 before getting on the traction table today. Pt also demo's antalgic gait throughout session - worse just before MILT. Pt is a pleasure to work with in the clinic today, and leaves with pain levels back down to 4/10. Pt is 73year old male with 1 month sudden onset of low back with R LE radiating pain. Pt now has difficulties completing general mobility, sleeping and ADLs. Pt demo deficits this date that include reduced lumbar AR OM, R LE strengthening and radiating pain. Testing this date indicate signs and symptoms of lumbar radiculopathy. Pt will benefit with PT services with progression of strength/ROM, manual, mechanical traction and modalities to return to PLOF. Pt prior to onset of current condition had no pain with able to complete full ADLs. Patient received education on their current pathology and how their condition effects them with their functional activities. Patient understood discussion and questions were answered. Patient understands their activity limitations and understands rational for treatment progression.       Plan for Next Session:  review HEP, flexion based program, nerve glides, piriformis stretching, R LE strengthening, man R LE traction, mech traction to finish treatment      Time In / Time Out:    0842/0929       If [facility] Please Indicate Time In/Out/Total Time  CPT Code Time in Time out Total Time                                                            Total for session             Timed Code/Total Treatment Minutes:  TE X 15' X 1;   NR X 15' X 1;   TXN X 17' X 1      Next Progress Note due:  Eval 12/2/21  Visit 10       Plan of Care Interventions:  [x] Therapeutic Exercise  [x] Modalities:  [x] Therapeutic Activity     [] Ultrasound  [x] Estim  [x] Gait Training      [] Cervical Traction [] Lumbar Traction  [x] Neuromuscular Re-education    [x] Cold/hotpack [] Iontophoresis   [x] Instruction in HEP      [] Vasopneumatic   [] Dry Needling    [x] Manual Therapy               [] Aquatic Therapy              Electronically signed by: Lyndsey Jordan II, PTA [id number]       12/2/2021 6:34 AM

## 2021-12-17 ENCOUNTER — HOSPITAL ENCOUNTER (OUTPATIENT)
Dept: PHYSICAL THERAPY | Age: 66
Setting detail: THERAPIES SERIES
Discharge: HOME | End: 2021-12-17
Payer: COMMERCIAL

## 2021-12-17 PROCEDURE — 97012 MECHANICAL TRACTION THERAPY: CPT

## 2021-12-17 PROCEDURE — 97110 THERAPEUTIC EXERCISES: CPT

## 2021-12-17 PROCEDURE — 97140 MANUAL THERAPY 1/> REGIONS: CPT

## 2021-12-17 NOTE — FLOWSHEET NOTE
Outpatient Physical Therapy  [place]           [x] Phone: [phone]   Fax: [phone]  [place]           [] Phone: [phone]   Fax: [phone]        Physical Therapy Daily Treatment Note  Date:  2021    Patient Name:  Meryl Underwood    :  1955  MRN: [id number]  Restrictions/Precautions:    Diagnosis:   Diagnosis: L DDD, L4-5 Radiculopathy  Date of Injury/Surgery: --  Treatment Diagnosis: Treatment Diagnosis: Back/R LE pain, weakness, reduced lumbar ROM    Insurance/Certification information: PT Insurance Information: [employer]   Referring Physician:  Referring Practitioner: Mariya Adams PA-C  Next Doctor Visit:    Plan of care signed (Y/N):  N, sent 21  Outcome Measure: Oswestry: 40% disability   Visit# / total visits:  -10 per POC  Pain level: 3-4/10   Goals:     Patient goals : return to normal mobility and ADLs without pain  Short term goals  Time Frame for Short term goals: Defer to [address] [facility] [address] term goals  Time Frame for Long term goals : 5 weeks 22  Long term goal 1: Pt will demo I with HEP/symptom management. Long term goal 2: Pt will demo normal gait mechanics without increase in back/leg pain with 6MWT. Long term goal 3: Pt will demo 5/5 R LE strength to decrease fall risk. Long term goal 4: Pt will report <20% disability per Oswestry. Long term goal 5: Pt will demo full lumbar AROM with <1/10 pain to ease ADLs. Summary of Evaluation:    Pt is 73year old male with 1 month sudden onset of low back with R LE radiating pain. Pt now has difficulties completing general mobility, sleeping and ADLs. Pt demo deficits this date that include reduced lumbar AR OM, R LE strengthening and radiating pain. Testing this date indicate signs and symptoms of lumbar radiculopathy. Pt will benefit with PT services with progression of strength/ROM, manual, mechanical traction and modalities to return to PLOF.  Pt prior to onset of current condition had no pain with able to complete full ADLs. Patient received education on their current pathology and how their condition effects them with their functional activities. Patient understood discussion and questions were answered. Patient understands their activity limitations and understands rational for treatment progression. Subjective:  Pt reports he rates his pain at 3-4/10 today. Pt stated that he has radicular symptoms down R leg that stops just above his R ankle. PT stated that therapy has helped to decrease his pain. Any changes in Ambulatory Summary Sheet? None        Objective:  See below  COVID screening questions were asked and patient attested that there had been no contact or symptoms  Tight hamstrings B  Exercises: (No more than 4 columns)   Exercise/Equipment 12/2/21  #1 12/10/21  #2 12/17/2021 #3           WARM UP         Nu step Seat 10/arms 9  L3 X 5' L-4 x 6'          TABLE      Abdominal bracing instruct  direct    Abdominal bracing  10x5\"hold, 2x30\" 10x2 5\"    *LTR 10x2  3\" 1x10 R/L ea Unilaterally with TA 10x2 ea side   *SKTC/DKTC 10\"x3     *Sitting piriforms stretch  15\"x4  man   *Seated sciatic nerve glide  10x2 with 3 ankle pumps  10x2 ankle pumps 90/90 hip/knee   SLR? Supine hip flexor stretch ? ? S/L manual         Manual therapy  HS stretch See maliha         STANDING                                                     PROPRIOCEPTION                                    MODALITIES      mec traction  15' 15/17' total 15'/17' total             Other Therapeutic Activities/Education:  Patient received education on their current pathology and how their condition effects them with their functional activities. Patient understood discussion and questions were answered. Patient understands their activity limitations and understands rational for treatment progression. Home Exercise Program:  HO issued, reviewed and discussed with patient. Pt agreed to comply.         Manual Treatments: S/L hip flexor stretch, nerve glides 90/90, MET for upslip correction x 15'      Modalities:  MILT, 120# max, 40# min, 2 steps, 50% speed, 90\" hold and 20\"rest x15'/17' total with stool under the knees for pain management      Communication with other providers:  POC sent 12/2/21      Assessment:  (Response towards treatment session) (Pain Rating) Pt tolerated treatment fairly well. LTR was changed to unilateral due to increased hamstring pain with B LTR. Pt rated pain at 3/10 with less intensity of LE radicular symptoms after traction. Pt is 73year old male with 1 month sudden onset of low back with R LE radiating pain. Pt now has difficulties completing general mobility, sleeping and ADLs. Pt demo deficits this date that include reduced lumbar AR OM, R LE strengthening and radiating pain. Testing this date indicate signs and symptoms of lumbar radiculopathy. Pt will benefit with PT services with progression of strength/ROM, manual, mechanical traction and modalities to return to PLOF. Pt prior to onset of current condition had no pain with able to complete full ADLs. Patient received education on their current pathology and how their condition effects them with their functional activities. Patient understood discussion and questions were answered. Patient understands their activity limitations and understands rational for treatment progression.       Plan for Next Session:  review HEP, flexion based program, nerve glides, piriformis stretching, R LE strengthening, man R LE traction, [facility] traction to finish treatment      Time In / Time Out:    0832/0929       If [facility] Please Indicate Time In/Out/Total Time  CPT Code Time in Time out Total Time                                                            Total for session             Timed Code/Total Treatment Minutes:  40'/ 62' 1 traction (17') 2 TE (25') 1 Man (15')       Next Progress Note due:  Chantelle 12/2/21  Visit 10       Plan of Care Interventions:  [x] Therapeutic Exercise  [x] Modalities:  [x] Therapeutic Activity     [] Ultrasound  [x] Estim  [x] Gait Training      [] Cervical Traction [] Lumbar Traction  [x] Neuromuscular Re-education    [x] Cold/hotpack [] Iontophoresis   [x] Instruction in HEP      [] Vasopneumatic   [] Dry Needling    [x] Manual Therapy               [] Aquatic Therapy              Electronically signed by:Cristina Slater, COSME  12/17/21 12/17/2021,3:04 PM

## 2021-12-22 ENCOUNTER — HOSPITAL ENCOUNTER (OUTPATIENT)
Dept: PHYSICAL THERAPY | Age: 66
Setting detail: THERAPIES SERIES
Discharge: HOME | End: 2021-12-22
Payer: COMMERCIAL

## 2021-12-22 PROCEDURE — 97012 MECHANICAL TRACTION THERAPY: CPT

## 2021-12-22 PROCEDURE — 97110 THERAPEUTIC EXERCISES: CPT

## 2021-12-22 PROCEDURE — 97140 MANUAL THERAPY 1/> REGIONS: CPT

## 2021-12-22 NOTE — FLOWSHEET NOTE
Outpatient Physical Therapy  [place]           [x] Phone: [phone]   Fax: [phone]  Kaleigh Alonso           [] Phone: [phone]   Fax: [phone]        Physical Therapy Daily Treatment Note  Date:  2021    Patient Name:  Andrew Vargas    :  1955  MRN: [id number]  Restrictions/Precautions:    Diagnosis:   Diagnosis: L DDD, L4-5 Radiculopathy  Date of Injury/Surgery: --  Treatment Diagnosis: Treatment Diagnosis: Back/R LE pain, weakness, reduced lumbar ROM    Insurance/Certification information: PT Insurance Information: [employer]   Referring Physician:  Referring Practitioner: Freedom Taveras PA-C  Next Doctor Visit:  Had Dr. Visit 21  Plan of care signed (Y/N):  N, sent 21  Outcome Measure: Oswestry: 40% disability   Visit# / total visits:  2 / 5-10 per POC  Pain level: 3-4/10   Goals:     Patient goals : return to normal mobility and ADLs without pain  Short term goals  Time Frame for Short term goals: Defer to [address] term goals  Time Frame for Long term goals : 5 weeks 22  Long term goal 1: Pt will demo I with HEP/symptom management. Long term goal 2: Pt will demo normal gait mechanics without increase in back/leg pain with 6MWT. Long term goal 3: Pt will demo 5/5 R LE strength to decrease fall risk. Long term goal 4: Pt will report <20% disability per Oswestry. Long term goal 5: Pt will demo full lumbar AROM with <1/10 pain to ease ADLs. Summary of Evaluation:    Pt is 73year old male with 1 month sudden onset of low back with R LE radiating pain. Pt now has difficulties completing general mobility, sleeping and ADLs. Pt demo deficits this date that include reduced lumbar AR OM, R LE strengthening and radiating pain. Testing this date indicate signs and symptoms of lumbar radiculopathy. Pt will benefit with PT services with progression of strength/ROM, manual, mechanical traction and modalities to return to PLOF.  Pt prior to onset of current condition had no pain with able to complete full ADLs. Patient received education on their current pathology and how their condition effects them with their functional activities. Patient understood discussion and questions were answered. Patient understands their activity limitations and understands rational for treatment progression. Subjective:    2/10 R anterior thigh pain. Thinks therapy is helping. Is getting scheduled for MRI low back d/t continued difficulties. Any changes in Ambulatory Summary Sheet? None        Objective:  See below  COVID screening questions were asked and patient attested that there had been no contact or symptoms    Rubbing R anterior thigh  Piriformis stretch (figure 4) stretch relieves. Gait mildly antalgic. Temporary prone on forearms abolished pain (for approx 2 min)  No up-slip today. Exercises: (No more than 4 columns)   Exercise/Equipment 12/2/21  #1 12/10/21  #2 12/17/2021 #3 12/22/21  #4            WARM UP          Nu step Seat 10/arms 9  L3 X 5' L-4 x 6'  L-4 x 10'          TABLE       Abdominal bracing instruct  direct     Abdominal bracing  10x5\"hold, 2x30\" 10x2 5\"  2 x 10 5\"   *LTR 10x2  3\" 1x10 R/L ea Unilaterally with TA 10x2 ea side    *SKTC/DKTC 10\"x3   1 x 10\" SKTC  1 x 15\" DKTC   *Sitting piriforms stretch  15\"x4  man 4 x 10\"   *Seated sciatic nerve glide  10x2 with 3 ankle pumps  10x2 ankle pumps 90/90 hip/knee    SLR? Supine hip flexor stretch ? ? S/L manual S/L manual  Significant limitation. Was going to trial prone hip flexor stretch. Pt noticed pain had shifted to only right anterior hip in prone. Prone on forearms abolished pain for approx 2 min, then peripheralized to anterior lower leg.           Manual therapy  HS stretch See beolw    STANDING                                                             PROPRIOCEPTION                                          MODALITIES       mech traction  15' 15/17' total 15'/17' total 15'/17' total              Other Therapeutic Activities/Education:  Patient received education on their current pathology and how their condition effects them with their functional activities. Patient understood discussion and questions were answered. Patient understands their activity limitations and understands rational for treatment progression. Home Exercise Program:  HO issued, reviewed and discussed with patient. Pt agreed to comply. Manual Treatments:  S/L hip flexor stretch, nerve glides 90/90      Modalities:  MILT, 120# max, 40# min, 2 steps, 50% speed, 90\" hold and 20\"rest x15'/17' total with stool under the knees for pain management      Communication with other providers:  POC sent 12/2/21      Assessment:  (Response towards treatment session) (Pain Rating)  End pain 1/10; reduced radicular intensity however symptoms at shin (no anterior thigh or back discomfort.)  Gait less antalgic after pelvic traction. Pt will benefit from continued therapy to address deficits remaining which limit pain-free mobility and self-care. Pt is 73year old male with 1 month sudden onset of low back with R LE radiating pain. Pt now has difficulties completing general mobility, sleeping and ADLs. Pt demo deficits this date that include reduced lumbar AR OM, R LE strengthening and radiating pain. Testing this date indicate signs and symptoms of lumbar radiculopathy. Pt will benefit with PT services with progression of strength/ROM, manual, mechanical traction and modalities to return to PLOF. Pt prior to onset of current condition had no pain with able to complete full ADLs. Patient received education on their current pathology and how their condition effects them with their functional activities. Patient understood discussion and questions were answered. Patient understands their activity limitations and understands rational for treatment progression.       Plan for Next Session:  review HEP, flexion based

## 2021-12-30 ENCOUNTER — HOSPITAL ENCOUNTER (OUTPATIENT)
Dept: PHYSICAL THERAPY | Age: 66
Setting detail: THERAPIES SERIES
Discharge: HOME | End: 2021-12-30
Payer: COMMERCIAL

## 2021-12-30 PROCEDURE — 97012 MECHANICAL TRACTION THERAPY: CPT

## 2021-12-30 PROCEDURE — 97140 MANUAL THERAPY 1/> REGIONS: CPT

## 2021-12-30 PROCEDURE — 97530 THERAPEUTIC ACTIVITIES: CPT

## 2021-12-30 PROCEDURE — 97110 THERAPEUTIC EXERCISES: CPT

## 2021-12-30 NOTE — FLOWSHEET NOTE
pain with able to complete full ADLs. Patient received education on their current pathology and how their condition effects them with their functional activities. Patient understood discussion and questions were answered. Patient understands their activity limitations and understands rational for treatment progression. Subjective:    1/10 R anterior thigh pain. Order for MRI without scheduled at this time. Been doing well overall lately. Any changes in Ambulatory Summary Sheet? None      Objective:    COVID screening questions were asked and patient attested that there had been no contact or symptoms    Denies pain with palpation  Normal gait mechanics   R SLS: 5 sec without increase   3/4 squat without aggravation  Lumbar flex: distal tibia with stretch    Ext: 50% deficit  With no pain    SB: lateral distal thigh with R SB with min discomfort       Rot: WFL     4+/5 hip/knee flexion with 4/10 pain with hip flexion, 4+/5 Hip ext     5/5 in all other directions   6MWT: 1303ft without rest break 4/10 pain into R hip   Oswestry: 16% disability         Exercises: (No more than 4 columns)   Exercise/Equipment 12/2/21  #1 12/10/21  #2 12/17/2021 #3 12/22/21  #4 12/30/21  #5             WARM UP           Nu step Seat 10/arms 9  L3 X 5' L-4 x 6'  L-4 x 10' Lv4   4'            TABLE        Abdominal bracing instruct  direct      Abdominal bracing  10x5\"hold, 2x30\" 10x2 5\"  2 x 10 5\"    *LTR 10x2  3\" 1x10 R/L ea Unilaterally with TA 10x2 ea side  Able to complete with B LE 10x without aggravation    *SKTC/DKTC 10\"x3   1 x 10\" SKTC  1 x 15\" DKTC SKTC with overpressure 15\"x2    *Sitting piriforms stretch  15\"x4  man 4 x 10\" Man    *Seated sciatic nerve glide  10x2 with 3 ankle pumps  10x2 ankle pumps 90/90 hip/knee     SLR?     10x2   Supine hip flexor stretch ? ? S/L manual S/L manual  Significant limitation. Was going to trial prone hip flexor stretch.   Pt noticed pain had shifted to only right anterior hip in prone.    Prone on forearms abolished pain for approx 2 min, then peripheralized to anterior lower leg. 30\"x2           Manual therapy  HS stretch See beolw     STANDING                                                                     PROPRIOCEPTION                                                MODALITIES        mech traction  15' 15/17' total 15'/17' total 15'/17' total 15'               Other Therapeutic Activities/Education: --    Home Exercise Program:  HO issued, reviewed and discussed with patient. Pt agreed to comply. Manual Treatments:  S/L hip flexor stretch, nerve glides 90/90, R LE man traction x10'       Modalities:  MILT, 120# max, 40# min, 2 steps, 50% speed, 90\" hold and 20\"rest x15'/17' total with stool under the knees for pain management      Communication with other providers:  POC sent 12/2/21      Assessment:  (Response towards treatment session) (Pain Rating)   Low pain and disability. Remains with low tolerance with prolonged ambulation. Progressing well. Will be having MRI in the future. Will be placed on hold at this time with possible return as indicated. Will discharge after 30 days if no return is completed. Pt agreed to this plan. End pain 1/10  Pt will benefit from continued therapy to address deficits remaining which limit pain-free mobility and self-care. Pt is 73year old male with 1 month sudden onset of low back with R LE radiating pain. Pt now has difficulties completing general mobility, sleeping and ADLs. Pt demo deficits this date that include reduced lumbar AR OM, R LE strengthening and radiating pain. Testing this date indicate signs and symptoms of lumbar radiculopathy. Pt will benefit with PT services with progression of strength/ROM, manual, mechanical traction and modalities to return to PLOF. Pt prior to onset of current condition had no pain with able to complete full ADLs.  Patient received education on their current pathology and how their condition effects them with their functional activities. Patient understood discussion and questions were answered. Patient understands their activity limitations and understands rational for treatment progression.       Plan for Next Session:   flexion based program, nerve glides, piriformis stretching, R LE strengthening, man R LE traction, mech traction to finish treatment      Time In / Time Out:    0822- 0918       If BW Please Indicate Time In/Out/Total Time  CPT Code Time in Time out Total Time                                                            Total for session             Timed Code/Total Treatment Minutes:  41'/56'    TE 16' (1), 15' TA,  Manual 10' (1), mech traction  15' (1)    Next Progress Note due:  Roxannaal 12/2/21  Visit 10       Plan of Care Interventions:  [x] Therapeutic Exercise  [x] Modalities:  [x] Therapeutic Activity     [] Ultrasound  [x] Estim  [x] Gait Training      [] Cervical Traction [] Lumbar Traction  [x] Neuromuscular Re-education    [x] Cold/hotpack [] Iontophoresis   [x] Instruction in HEP      [] Vasopneumatic   [] Dry Needling    [x] Manual Therapy               [] Aquatic Therapy              Electronically signed by:Russ Car, PT, DPT, OCS    12/30/2021 8:30 AM

## 2022-01-27 ENCOUNTER — HOSPITAL ENCOUNTER (OUTPATIENT)
Dept: MRI IMAGING | Age: 67
Discharge: HOME | End: 2022-01-27

## 2022-01-27 DIAGNOSIS — M62.81 MUSCLE WEAKNESS (GENERALIZED): ICD-10-CM

## 2022-01-27 DIAGNOSIS — M51.86 CALCIFICATION OF INTERVERTEBRAL CARTILAGE OR DISC OF LUMBAR REGION: ICD-10-CM

## 2022-01-27 DIAGNOSIS — M54.16 LUMBAR RADICULOPATHY: ICD-10-CM

## 2022-01-27 NOTE — PROGRESS NOTES
Pt was unable to perform MRI exam due to claustro.   Pt will contact doctor and try to reschedule exam.

## 2022-02-02 NOTE — DISCHARGE SUMMARY
Outpatient Physical Therapy           [place]           [] Phone: [phone]   Fax: [phone]  [place]           [] Phone: [phone]   Fax: [phone]      To:    Anuradha Solano PA-C     From: Geovani Eubanks, PT, DPT, OCS     Patient: Sumeet Santa                  : 1955  Diagnosis:     L DDD, L4-5 Radiculopathy   Date: 2022  Treatment Diagnosis:   Back/R LE pain, weakness, reduced lumbar ROM        []  Progress Note                [x]  Discharge Note    Evaluation Date:  21   Total Visits to date:   3 Cancels/No-shows to date:  1    Subjective:  Per note on 21   1/10 R anterior thigh pain. Order for MRI without scheduled at this time. Been doing well overall lately. Plan of Care/Treatment to date:  [x] Therapeutic Exercise    [x] Modalities:  [] Therapeutic Activity     [] Ultrasound  [] Electrical Stimulation  [] Gait Training      [] Cervical Traction   [] Lumbar Traction  [x] Neuromuscular Re-education  [] Cold/hotpack [] Iontophoresis  [x] Instruction in HEP      Other:  [x] Manual Therapy       []  Vasopneumatic  [] Aquatic Therapy       []   Dry Needle Therapy                      Objective/Significant Findings At Last Visit/Comments:  Per note on 21    Denies pain with palpation  Normal gait mechanics   R SLS: 5 sec without increase   3/4 squat without aggravation  Lumbar flex: distal tibia with stretch    Ext: 50% deficit  With no pain    SB: lateral distal thigh with R SB with min discomfort       Rot: WFL     4+/5 hip/knee flexion with 4/10 pain with hip flexion, 4+/5 Hip ext     5/5 in all other directions   6MWT: 1303ft without rest break 4/10 pain into R hip   Oswestry: 16% disability       Assessment:  Pt completed 3 visits since start of therapy on 21. At last visit with low pain and disability on 21. Remains with low tolerance with prolonged ambulation. Progressing well. Placed on hold at this time with possible return as indicated. Will discharge after 30 days if no return is completed. Pt agreed to this plan. Will be discharged at this time. Goal Status:  [x] Achieved [x] Partially Achieved  [] Not Achieved     Patient goals : return to normal mobility and ADLs without pain  Short term goals  Time Frame for Short term goals: Defer to [address] term goals  Time Frame for Long term goals : 5 weeks 1/8/22  Long term goal 1: Pt will demo I with HEP/symptom management. MET  Long term goal 2: Pt will demo normal gait mechanics without increase in back/leg pain with 6MWT. Partially MET  Long term goal 3: Pt will demo 5/5 R LE strength to decrease fall risk. Partially MET   Long term goal 4: Pt will report <20% disability per Oswestry. MET  Long term goal 5: Pt will demo full lumbar AROM with <1/10 pain to ease ADLs. Partially MET              Patient Status: [] Continue per initial plan of Care     [x] Patient now discharged     [] Additional visits requested, Please re-certify for additional visits: If we are requesting more visits, we fully anticipate the patient's condition is expected to improve within the treatment timeframe we are requesting. Electronically signed by:  Ines Morales, PT, DPT, OCS  2/2/2022, 11:27 AM    2/2/2022 11:27 AM     If you have any questions or concerns, please don't hesitate to call.   Thank you for your referral.    Physician Signature:______________________ Date:______ Time: ________  By signing above, therapists plan is approved by physician

## 2022-02-08 ENCOUNTER — HOSPITAL ENCOUNTER (OUTPATIENT)
Dept: MRI IMAGING | Age: 67
Discharge: HOME | End: 2022-02-08
Payer: COMMERCIAL

## 2022-02-08 PROCEDURE — 72148 MRI LUMBAR SPINE W/O DYE: CPT

## 2022-06-30 ENCOUNTER — OFFICE VISIT (OUTPATIENT)
Dept: CARDIOLOGY CLINIC | Age: 67
End: 2022-06-30
Payer: COMMERCIAL

## 2022-06-30 VITALS
BODY MASS INDEX: 34.02 KG/M2 | HEIGHT: 71 IN | HEART RATE: 76 BPM | WEIGHT: 243 LBS | DIASTOLIC BLOOD PRESSURE: 86 MMHG | SYSTOLIC BLOOD PRESSURE: 120 MMHG

## 2022-06-30 DIAGNOSIS — I25.10 CORONARY ARTERY DISEASE INVOLVING NATIVE CORONARY ARTERY OF NATIVE HEART WITHOUT ANGINA PECTORIS: Primary | ICD-10-CM

## 2022-06-30 PROCEDURE — 93000 ELECTROCARDIOGRAM COMPLETE: CPT | Performed by: INTERNAL MEDICINE

## 2022-06-30 PROCEDURE — 99213 OFFICE O/P EST LOW 20 MIN: CPT | Performed by: INTERNAL MEDICINE

## 2022-06-30 PROCEDURE — 1123F ACP DISCUSS/DSCN MKR DOCD: CPT | Performed by: INTERNAL MEDICINE

## 2022-06-30 RX ORDER — PRAVASTATIN SODIUM 20 MG
TABLET ORAL
COMMUNITY
Start: 2022-06-04

## 2022-06-30 RX ORDER — ESCITALOPRAM OXALATE 10 MG/1
TABLET ORAL
COMMUNITY

## 2022-06-30 NOTE — PROGRESS NOTES
CARDIOLOGY NOTE      6/30/2022    RE: Aislinn Arzate  ([birth year])                               TO:  Dr. Madhavi Shaffer MD      Thank you for involving me in taking care of your  patient Aislinn Arzate, who is a  78y.o. year old      male with past medical  history of  Cad, moderate LAD disease patient hyperlipidimea  is  seen today   Has back issues      Vitals:    06/30/22 0813   BP: 120/86   Pulse: 76       Current Outpatient Medications   Medication Sig Dispense Refill    pravastatin (PRAVACHOL) 20 MG tablet TAKE 1 TABLET BY MOUTH DAILY AT BEDTIME      escitalopram (LEXAPRO) 10 MG tablet       aspirin 325 MG tablet Take 325 mg by mouth daily. No current facility-administered medications for this visit. Allergies: Patient has no known allergies. Past Medical History:   Diagnosis Date    14 day event monitor 07/26/2018    Nothing significant found.  CAD (coronary artery disease) 10/08    Per cardiac cath.     Diabetes mellitus ([facility] [address]     Dx 2020    H/O 24 hour EKG monitoring 7/18/12    WNL    H/O cardiac catheterization 10/08    Moderate disease of the distal LAD     H/O cardiovascular stress test 12/2/10    WNL EF 63%    H/O echocardiogram 12/1/09    WNL EF 55%    H/O echocardiogram 04/04/2014    EF 60%, trace mitral and tricuspid insufficiencies, estimated RV systolic pressure is 79.8 mmHg, no pericardial effusion    H/O: depression     History of exercise stress test 08/13/2018    treadmill    Hyperlipidemia     Palpitation     Sleep apnea      Past Surgical History:   Procedure Laterality Date    CARPAL TUNNEL RELEASE  3/08    COLONOSCOPY  4-26-13    diverticulosis    COLONOSCOPY  2013    DIAGNOSTIC CARDIAC CATH LAB PROCEDURE  10/07/08    Moderate disease of the distal LAD    TONSILLECTOMY       Family History   Problem Relation Age of Onset    Cancer Father     Heart Disease Brother      Social History     Tobacco Use    Smoking status: Never Smoker    Smokeless tobacco: Never Used    Tobacco comment: reviewed 5/22/15   Substance Use Topics    Alcohol use: No          Review of systems:  HEENT: Neg  Card:palpuitations   GI;Neg  : Neg  Neuro: Neg  Psych: Neg  Derm: Neg  MS; Neg  All: Documented  Constitutional: Neg    Objective:      Physical Exam:  /86   Pulse 76   Ht 5' 11\" (1.803 m)   Wt 243 lb (110.2 kg)   BMI 33.89 kg/m²   Wt Readings from Last 3 Encounters:   06/30/22 243 lb (110.2 kg)   06/30/21 234 lb 6.4 oz (106.3 kg)   03/13/20 232 lb (105.2 kg)     Body mass index is 33.89 kg/m². GENERAL - Alert, oriented, pleasant, in no apparent distress. Head unremarkable  Eyes  Not injected conjunctiva  ENT - normal mucosa  Neck - Supple. No jugular venous distention noted. No carotid bruits. Cardiovascular - Normal S1 and S2 without obvious murmur or gallop. Extremities - No cyanosis, clubbing, or significant edema. Pulmonary - No respiratory distress. No wheezes or rales. Pulses: Bilateral radial and pedal pulses normal  Abdomen - no tenderness  Musculoskeletal - normal strength  Neurologic -   There are  no gross focal neurologic abnormalities. Skin-  No rash  Affect; normal mood    DATA:  No results found for: CKTOTAL, CKMB, CKMBINDEX, TROPONINI  BNP:  No results found for: BNP  PT/INR:  No results found for: PTINR  No results found for: LABA1C  Lab Results   Component Value Date    CHOL 180 06/13/2019    TRIG 230 06/13/2019    HDL 43 06/13/2019    LDLCALC 91 06/13/2019    LDLDIRECT 107 (H) 09/23/2010     Lab Results   Component Value Date    ALT 39 06/13/2019    AST 32 06/13/2019     TSH:  No results found for: TSH            Assessment/ Plan:     Patient seen , interviewed and examined     PLAN:           -  LIPID MANAGEMENT:  Available lipid  lab data reviewed  and patient was given dietary advice. NCEP- ATP III guidelines reviewed with patient.     -   Changes  in medicines made: No     On pravachol compliant check LDL -     CORONARY ARTERY DISEASE:  asymptomatic     All available  tests in chart reviewed. Management discussed .   Testing ordered  no            Mild on meds    - has back issues , per PCP

## 2022-09-21 ENCOUNTER — TELEPHONE (OUTPATIENT)
Dept: CARDIOLOGY CLINIC | Age: 67
End: 2022-09-21

## 2022-09-22 ENCOUNTER — TELEPHONE (OUTPATIENT)
Dept: CARDIOLOGY CLINIC | Age: 67
End: 2022-09-22

## 2022-09-22 NOTE — TELEPHONE ENCOUNTER
Cardiologist: Dr. Torin Mason  Surgeon: Dr. Flori Benito  Surgery: L4-5 Laminectomty Discectomy  Anesthesia: General  Date: 9/26/2022  FAX# [phone]  # [phone]    Last OV 6/30/2022 w/[place]          -  LIPID MANAGEMENT:  Available lipid  lab data reviewed  and patient was given dietary advice. NCEP- ATP III guidelines reviewed with patient. -   Changes  in medicines made: No     On pravachol compliant check LDL                    -     CORONARY ARTERY DISEASE:  asymptomatic     All available  tests in chart reviewed. Management discussed . Testing ordered  no            Mild on meds     - has back issues , per PCP             Last EKG- 6/30/2022    Stress Test- 8/13/2018  Normal exercise performance without angina and ischemic EKG changes.      Echo-  9/28/2021      Cath- 10/2008        Aspirin

## 2023-05-23 LAB
ALBUMIN SERPL-MCNC: 4.5 G/DL
ALP BLD-CCNC: 100 U/L
ALT SERPL-CCNC: 69 U/L
ANION GAP SERPL CALCULATED.3IONS-SCNC: NORMAL MMOL/L
AST SERPL-CCNC: 48 U/L
AVERAGE GLUCOSE: NORMAL
BASOPHILS ABSOLUTE: 0.1 /ΜL
BASOPHILS RELATIVE PERCENT: 1 %
BILIRUB SERPL-MCNC: 0.7 MG/DL (ref 0.1–1.4)
BUN BLDV-MCNC: 16 MG/DL
C-REACTIVE PROTEIN: 3
CALCIUM SERPL-MCNC: 9.7 MG/DL
CHLORIDE BLD-SCNC: 103 MMOL/L
CHOLESTEROL, TOTAL: 216 MG/DL
CHOLESTEROL/HDL RATIO: NORMAL
CO2: 20 MMOL/L
CREAT SERPL-MCNC: 1.09 MG/DL
EGFR: NORMAL
EOSINOPHILS ABSOLUTE: 0.2 /ΜL
EOSINOPHILS RELATIVE PERCENT: 2 %
GLUCOSE BLD-MCNC: 161 MG/DL
HBA1C MFR BLD: 8.3 %
HCT VFR BLD CALC: 46.6 % (ref 41–53)
HDLC SERPL-MCNC: 40 MG/DL (ref 35–70)
HEMOGLOBIN: 15 G/DL (ref 13.5–17.5)
LDL CHOLESTEROL CALCULATED: 143 MG/DL (ref 0–160)
LYMPHOCYTES ABSOLUTE: 2 /ΜL
LYMPHOCYTES RELATIVE PERCENT: 26 %
MCH RBC QN AUTO: 27.6 PG
MCHC RBC AUTO-ENTMCNC: 32.2 G/DL
MCV RBC AUTO: 86 FL
MONOCYTES ABSOLUTE: 0.6 /ΜL
MONOCYTES RELATIVE PERCENT: 7 %
NEUTROPHILS ABSOLUTE: 4.9 /ΜL
NEUTROPHILS RELATIVE PERCENT: 64 %
NONHDLC SERPL-MCNC: NORMAL MG/DL
PLATELET # BLD: 307 K/ΜL
PMV BLD AUTO: NORMAL FL
POTASSIUM SERPL-SCNC: 4.5 MMOL/L
RBC # BLD: 5.43 10^6/ΜL
SODIUM BLD-SCNC: 138 MMOL/L
TOTAL PROTEIN: 7.2
TRIGL SERPL-MCNC: 182 MG/DL
VLDLC SERPL CALC-MCNC: 33 MG/DL
WBC # BLD: 7.8 10^3/ML

## 2023-06-21 ENCOUNTER — TELEPHONE (OUTPATIENT)
Dept: CARDIOLOGY CLINIC | Age: 68
End: 2023-06-21

## 2023-06-21 ENCOUNTER — OFFICE VISIT (OUTPATIENT)
Dept: CARDIOLOGY CLINIC | Age: 68
End: 2023-06-21
Payer: COMMERCIAL

## 2023-06-21 ENCOUNTER — PROCEDURE VISIT (OUTPATIENT)
Dept: CARDIOLOGY CLINIC | Age: 68
End: 2023-06-21
Payer: COMMERCIAL

## 2023-06-21 VITALS
WEIGHT: 237.8 LBS | HEART RATE: 61 BPM | SYSTOLIC BLOOD PRESSURE: 112 MMHG | DIASTOLIC BLOOD PRESSURE: 68 MMHG | BODY MASS INDEX: 33.29 KG/M2 | HEIGHT: 71 IN

## 2023-06-21 DIAGNOSIS — Z82.49 FH: CAD (CORONARY ARTERY DISEASE): ICD-10-CM

## 2023-06-21 DIAGNOSIS — E78.2 MIXED HYPERLIPIDEMIA: ICD-10-CM

## 2023-06-21 DIAGNOSIS — R07.9 CHEST PAIN, UNSPECIFIED TYPE: ICD-10-CM

## 2023-06-21 DIAGNOSIS — I25.10 CORONARY ARTERY DISEASE INVOLVING NATIVE CORONARY ARTERY OF NATIVE HEART WITHOUT ANGINA PECTORIS: ICD-10-CM

## 2023-06-21 DIAGNOSIS — R00.2 PALPITATION: ICD-10-CM

## 2023-06-21 DIAGNOSIS — I25.10 CORONARY ARTERY DISEASE INVOLVING NATIVE CORONARY ARTERY OF NATIVE HEART WITHOUT ANGINA PECTORIS: Primary | ICD-10-CM

## 2023-06-21 PROCEDURE — 1123F ACP DISCUSS/DSCN MKR DOCD: CPT | Performed by: NURSE PRACTITIONER

## 2023-06-21 PROCEDURE — 93000 ELECTROCARDIOGRAM COMPLETE: CPT | Performed by: NURSE PRACTITIONER

## 2023-06-21 PROCEDURE — 99214 OFFICE O/P EST MOD 30 MIN: CPT | Performed by: NURSE PRACTITIONER

## 2023-06-21 PROCEDURE — 93015 CV STRESS TEST SUPVJ I&R: CPT | Performed by: INTERNAL MEDICINE

## 2023-06-21 RX ORDER — METFORMIN HYDROCHLORIDE EXTENDED-RELEASE TABLETS 500 MG/1
500 TABLET, FILM COATED, EXTENDED RELEASE ORAL
COMMUNITY

## 2023-06-21 ASSESSMENT — ENCOUNTER SYMPTOMS
SHORTNESS OF BREATH: 0
ORTHOPNEA: 0

## 2023-06-21 NOTE — PROGRESS NOTES
6/21/2023  Primary cardiologist: Dr. Anna Carmen  is an established 80 y.o.  male here for follow-up on coronary artery disease and hyperlipidemia       SUBJECTIVE/OBJECTIVE:  HPI : Puja Mason is a 43-year-old gentleman with a past medical history of CAD, hyperlipidemia, sleep apnea and palpitations. In 2008 he underwent a LHC that demonstrated moderate disease of the LAD. Puja Mason reports he is feeling well. He retired in November 2021 and is enjoying life. He is hiking three times a week. He states last week he had some jaw pain that radiated to R arm while working in the yard. He states that the pain resolved at rest. He has not had more pain but has not exerted either. Review of Systems   Constitutional: Negative for diaphoresis and malaise/fatigue. Cardiovascular:  Positive for chest pain. Negative for claudication, dyspnea on exertion, irregular heartbeat, leg swelling, near-syncope, orthopnea, palpitations and paroxysmal nocturnal dyspnea. Respiratory:  Negative for shortness of breath. Neurological:  Negative for dizziness and light-headedness. Vitals:    06/21/23 0700   BP: 112/68   Site: Left Upper Arm   Position: Sitting   Cuff Size: Large Adult   Pulse: 61   Weight: 237 lb 12.8 oz (107.9 kg)   Height: 5' 11\" (1.803 m)     Patient-Reported Vitals 7/27/2020   Patient-Reported Weight 117lb   Patient-Reported Height 5'11   Patient-Reported Systolic 161   Patient-Reported Diastolic 79   Patient-Reported Pulse 77     Wt Readings from Last 3 Encounters:   06/21/23 237 lb 12.8 oz (107.9 kg)   06/30/22 243 lb (110.2 kg)   06/30/21 234 lb 6.4 oz (106.3 kg)     Body mass index is 33.17 kg/m². Physical Exam  Vitals reviewed. Constitutional:       General: He is not in acute distress. Appearance: Normal appearance. He is obese. He is not ill-appearing. HENT:      Head: Atraumatic. Neck:      Vascular: No carotid bruit.    Cardiovascular:      Rate and Rhythm: Normal rate and

## 2023-06-21 NOTE — PATIENT INSTRUCTIONS
Please be informed that if you contact our office outside of normal business hours the physician on call cannot help with any scheduling or rescheduling issues, procedure instruction questions or any type of medication issue. We advise you for any urgent/emergency that you go to the nearest emergency room! PLEASE CALL OUR OFFICE DURING NORMAL BUSINESS HOURS    Monday - Friday   8 am to 5 pm    [facility]: [phone]    [place]:  [phone]    **It is YOUR responsibilty to bring medication bottles and/or updated medication list to [address] [place] [address]. This will allow us to better serve you and all your healthcare needs**    Thank you for allowing us to care for you today! We want to ensure we can follow your treatment plan and we strive to give you the best outcomes and experience possible. If you ever have a life threatening emergency and call 911 - for an ambulance (EMS)   Our providers can only care for you at:   [facility] or [facility]. Even if you have someone take you or you drive yourself we can only care for you in a [facility] facility. Our providers are not setup at the other healthcare locations!

## 2023-06-21 NOTE — PROGRESS NOTES
ECG report evaluated by Dr. Ronni Putnam. No acute findings. Pt educated and released. Recommended artificial tears to use: 1 drop 4x a day in both eyes.

## 2023-07-10 ENCOUNTER — OFFICE VISIT (OUTPATIENT)
Dept: CARDIOLOGY CLINIC | Age: 68
End: 2023-07-10
Payer: COMMERCIAL

## 2023-07-10 VITALS
SYSTOLIC BLOOD PRESSURE: 120 MMHG | HEIGHT: 71 IN | WEIGHT: 239.4 LBS | BODY MASS INDEX: 33.52 KG/M2 | DIASTOLIC BLOOD PRESSURE: 82 MMHG | HEART RATE: 74 BPM

## 2023-07-10 DIAGNOSIS — R07.9 CHEST PAIN RADIATING TO JAW: Primary | ICD-10-CM

## 2023-07-10 PROCEDURE — 1123F ACP DISCUSS/DSCN MKR DOCD: CPT | Performed by: INTERNAL MEDICINE

## 2023-07-10 PROCEDURE — 99214 OFFICE O/P EST MOD 30 MIN: CPT | Performed by: INTERNAL MEDICINE

## 2023-07-10 NOTE — PATIENT INSTRUCTIONS
We are committed to providing you the best care possible. If you receive a survey after visiting one of our offices, please take time to share your experience concerning your physician office visit. These surveys are confidential and no health information about you is shared. We are eager to improve for you and we are counting on your feedback to help make that happen. **It is YOUR responsibilty to bring medication bottles and/or updated medication list to [address]. This will allow us to better serve you and all your healthcare needs**    Thank you for allowing us to care for you today! We want to ensure we can follow your treatment plan and we strive to give you the best outcomes and experience possible. If you ever have a life threatening emergency and call 911 - for an ambulance (EMS)   Our providers can only care for you at:   [facility] or [facility]. Even if you have someone take you or you drive yourself we can only care for you in a [facility]. Our providers are not setup at the other healthcare locations!

## 2023-07-10 NOTE — PROGRESS NOTES
ALT 69 05/23/2023    AST 48 05/23/2023     TSH:  No results found for: TSH            Assessment/ Plan:     Patient seen , interviewed and examined     PLAN:       - Normal stress , had jaw pain  Stress cardiolite s cont to be symptomatic        -  LIPID MANAGEMENT:  Available lipid  lab data reviewed  and patient was given dietary advice. NCEP- ATP III guidelines reviewed with patient. -   Changes  in medicines made: No     On pravachol compliant check LDL                  -     CORONARY ARTERY DISEASE:  asymptomatic     All available  tests in chart reviewed. Management discussed . Testing ordered  no            Mild on meds  . LAD disease on a cath done in 2008 being managed medically                                    -   DIABETES MELLITUS: Available pertinent lab data reviewed   and  patient was given dietary advice . Advised to check blood glucose level on a regular basis.       -   Changes  in medicines made: No        On metformin we will continue

## 2023-07-24 ENCOUNTER — PROCEDURE VISIT (OUTPATIENT)
Dept: CARDIOLOGY CLINIC | Age: 68
End: 2023-07-24

## 2023-07-24 DIAGNOSIS — R07.9 CHEST PAIN RADIATING TO JAW: ICD-10-CM

## 2023-07-24 LAB
LV EF: 60 %
LVEF MODALITY: NORMAL

## 2023-07-25 ENCOUNTER — TELEPHONE (OUTPATIENT)
Dept: CARDIOLOGY CLINIC | Age: 68
End: 2023-07-25

## 2023-07-25 NOTE — TELEPHONE ENCOUNTER
Normal tracer uptake in all segments of myocardium on stress ans rest   images. Normal Stress nuclear scintigraphic study suggestive of normal   myocardial perfusion. Gated images demonstrate normal left ventricular   systolic function with EF of 60 %. Recommendation   Continue present medical therapy and followup in office as scheduled. Left message of normal results, ok per pt communication form.

## 2023-08-15 ENCOUNTER — OFFICE VISIT (OUTPATIENT)
Dept: CARDIOLOGY CLINIC | Age: 68
End: 2023-08-15
Payer: COMMERCIAL

## 2023-08-15 VITALS
OXYGEN SATURATION: 96 % | HEIGHT: 71 IN | DIASTOLIC BLOOD PRESSURE: 60 MMHG | HEART RATE: 68 BPM | BODY MASS INDEX: 32.34 KG/M2 | WEIGHT: 231 LBS | SYSTOLIC BLOOD PRESSURE: 100 MMHG

## 2023-08-15 DIAGNOSIS — E78.2 MIXED HYPERLIPIDEMIA: Primary | ICD-10-CM

## 2023-08-15 PROCEDURE — 1123F ACP DISCUSS/DSCN MKR DOCD: CPT | Performed by: INTERNAL MEDICINE

## 2023-08-15 PROCEDURE — 99214 OFFICE O/P EST MOD 30 MIN: CPT | Performed by: INTERNAL MEDICINE

## 2023-08-15 NOTE — PROGRESS NOTES
CARDIOLOGY NOTE      8/15/2023    RE: Jennyfer Moyer  ([birth year])                               TO:  Dr. Carmelo Diamond MD      Thank you for involving me in taking care of your  patient Jennyfer Moyer, who is a  80y.o. year old      male with past medical  history of  Cad, moderate LAD disease for fu on stress  Vitals:    08/15/23 1503   BP: 100/60   Pulse: 68   SpO2: 96%         Current Outpatient Medications   Medication Sig Dispense Refill    metFORMIN, OSM, (FORTAMET) 500 MG extended release tablet Take 1 tablet by mouth in the morning and at bedtime      pravastatin (PRAVACHOL) 20 MG tablet TAKE 1 TABLET BY MOUTH DAILY AT BEDTIME      escitalopram (LEXAPRO) 10 MG tablet daily      aspirin 325 MG tablet Take 1 tablet by mouth daily       No current facility-administered medications for this visit. Allergies: Patient has no known allergies. Past Medical History:   Diagnosis Date    14 day event monitor 07/26/2018    Nothing significant found. CAD (coronary artery disease) 10/08    Per cardiac cath.     Diabetes mellitus ([address])     Dx 2020    H/O 24 hour EKG monitoring 7/18/12    WNL    H/O cardiac catheterization 10/08    Moderate disease of the distal LAD     H/O cardiovascular stress test 12/2/10    WNL EF 63%    H/O echocardiogram 12/1/09    WNL EF 55%    H/O echocardiogram 04/04/2014    EF 60%, trace mitral and tricuspid insufficiencies, estimated RV systolic pressure is 90.3 mmHg, no pericardial effusion    H/O: depression     History of exercise stress test 08/13/2018    treadmill    Hyperlipidemia     Palpitation     Sleep apnea      Past Surgical History:   Procedure Laterality Date    CARPAL TUNNEL RELEASE  3/08    COLONOSCOPY  4-26-13    diverticulosis    COLONOSCOPY  2013    DIAGNOSTIC CARDIAC CATH LAB PROCEDURE  10/07/08    Moderate disease of the distal LAD    TONSILLECTOMY       Family History   Problem Relation Age of Onset    Cancer Father     Heart Disease Brother

## 2023-08-15 NOTE — PATIENT INSTRUCTIONS
Please be informed that if you contact our office outside of normal business hours the physician on call cannot help with any scheduling or rescheduling issues, procedure instruction questions or any type of medication issue. We advise you for any urgent/emergency that you go to the nearest emergency room! PLEASE CALL OUR OFFICE DURING NORMAL BUSINESS HOURS    Monday - Friday   8 am to 5 pm    [place]: [address] [place]: [phone]    [place]:  [phone]    **It is YOUR responsibilty to bring medication bottles and/or updated medication list to [facility]. This will allow us to better serve you and all your healthcare needs**    Thank you for allowing us to care for you today! We want to ensure we can follow your treatment plan and we strive to give you the best outcomes and experience possible. If you ever have a life threatening emergency and call 911 - for an ambulance (EMS)   Our providers can only care for you at:   [facility] or [facility]. Even if you have someone take you or you drive yourself we can only care for you in a [facility] facility. Our providers are not setup at the other healthcare locations! We are committed to providing you the best care possible. If you receive a survey after visiting one of our offices, please take time to share your experience concerning your physician office visit. These surveys are confidential and no health information about you is shared. We are eager to improve for you and we are counting on your feedback to help make that happen.

## 2023-09-25 ENCOUNTER — OFFICE VISIT (OUTPATIENT)
Dept: CARDIOLOGY CLINIC | Age: 68
End: 2023-09-25
Payer: COMMERCIAL

## 2023-09-25 ENCOUNTER — TELEPHONE (OUTPATIENT)
Dept: CARDIOLOGY CLINIC | Age: 68
End: 2023-09-25

## 2023-09-25 ENCOUNTER — TRANSCRIBE ORDERS (OUTPATIENT)
Dept: CARDIOLOGY CLINIC | Age: 68
End: 2023-09-25

## 2023-09-25 VITALS — BODY MASS INDEX: 32.06 KG/M2 | HEIGHT: 71 IN | WEIGHT: 229 LBS

## 2023-09-25 DIAGNOSIS — M25.519 SHOULDER PAIN, UNSPECIFIED CHRONICITY, UNSPECIFIED LATERALITY: Primary | ICD-10-CM

## 2023-09-25 DIAGNOSIS — Z01.810 PRE-OPERATIVE CARDIOVASCULAR EXAMINATION: Primary | ICD-10-CM

## 2023-09-25 PROCEDURE — 93000 ELECTROCARDIOGRAM COMPLETE: CPT | Performed by: INTERNAL MEDICINE

## 2023-09-25 PROCEDURE — 99214 OFFICE O/P EST MOD 30 MIN: CPT | Performed by: INTERNAL MEDICINE

## 2023-09-25 PROCEDURE — 1123F ACP DISCUSS/DSCN MKR DOCD: CPT | Performed by: INTERNAL MEDICINE

## 2023-09-25 NOTE — TELEPHONE ENCOUNTER
Patient called stating that he has been experiencing shoulder pain and lower jaw numbness. Please call him back at ph# [phone].

## 2023-09-25 NOTE — PROGRESS NOTES
CARDIOLOGY NOTE      9/25/2023    RE: Christal Goldstein  ([birth year])                               TO:  Dr. Charles Noriega MD      Thank you for involving me in taking care of your  patient Christal Goldstein, who is a  80y.o. year old      male with past medical  history of  Cad, moderate LAD disease for fu on stress  Has been having jaw pain  On the last visit we discussed cardiac catheterization however the patient wanted to hold off now has been having jaw pain and some left shoulder pain and has multiple cardiac risk factors hence we will proceed with the cardiac catheterization        Current Outpatient Medications   Medication Sig Dispense Refill    metFORMIN, OSM, (FORTAMET) 500 MG extended release tablet Take 1 tablet by mouth in the morning and at bedtime      pravastatin (PRAVACHOL) 20 MG tablet TAKE 1 TABLET BY MOUTH DAILY AT BEDTIME      escitalopram (LEXAPRO) 10 MG tablet daily      aspirin 325 MG tablet Take 1 tablet by mouth daily       No current facility-administered medications for this visit. Allergies: Patient has no known allergies. Past Medical History:   Diagnosis Date    14 day event monitor 07/26/2018    Nothing significant found. CAD (coronary artery disease) 10/08    Per cardiac cath.     Diabetes mellitus ([address])     Dx 2020    H/O 24 hour EKG monitoring 7/18/12    WNL    H/O cardiac catheterization 10/08    Moderate disease of the distal LAD     H/O cardiovascular stress test 12/2/10    WNL EF 63%    H/O echocardiogram 12/1/09    WNL EF 55%    H/O echocardiogram 04/04/2014    EF 60%, trace mitral and tricuspid insufficiencies, estimated RV systolic pressure is 59.8 mmHg, no pericardial effusion    H/O: depression     History of exercise stress test 08/13/2018    treadmill    Hyperlipidemia     Palpitation     Sleep apnea      Past Surgical History:   Procedure Laterality Date    CARPAL TUNNEL RELEASE  3/08    COLONOSCOPY  4-26-13    diverticulosis    COLONOSCOPY  2013

## 2023-09-25 NOTE — TELEPHONE ENCOUNTER
[address] [facility] WITH POSSIBLE PERCUTANEOUS CORONARY INTERVENTION        Patient Name: Tierney Bowen   : 1955  MRN# [id number]    Date of Procedure: 10/5/23 Time: 36 am Arrival Time: 545 am    The catheterization and angiogram are usually outpatient procedures, however if stenting is needed you may need to stay overnight. You will need to arrive at the hospital two hours before the procedure. You will go to registration in the main lobby. You will need to arrange for someone to drive you home. HOSPITAL:  [facility])      X   If you have received orders for blood work and or a chest x-ray, please have         them done on assigned date at [facility],           [facility], or [facility].     X Please do not have anything by mouth after midnight prior to or 8 hours before   the procedure. X You may take your medications with a sip of water in the morning of your               procedure or take them with you to the hospital                         X If you are taking Metformin (Glucophage) or any medication containing Metformin (Glucophage) you must hold this medication the day before the procedure 10/4/23 , the day of your procedure and two days after your procedure. You may restart Metformin two days after your procedure on 10/8/23. X If you take Viagra (Sildenafil) or Cialis (Tadalafil) you will need to hold it for 3 days before your procedure.                                         \

## 2023-09-25 NOTE — PATIENT INSTRUCTIONS
Call to patient-  Patient states she had what she thinks was a bartholin gland cyst on the left side of her vaginal opening  Patient states she noticed it last week and it got to the size of a golf ball over the weekend   Patient states she googled what she should do and took baking soda baths which did end up making the cyst rupture.  Patient states a lot of blood and pus drained from it  Patient denies fever   Patient states the area is almost back to normal now, no further drainage, maybe a small area of redness just where it actually opened. Denies inflammation.  Discussed with Dr. Gleason and he states if it is not draining and she does not have a fever she can continue to monitor at home. Can use warm compresses if needed  Patient will call us back if this area starts to get bigger again or she develops a fever.  Patient verbalizes understanding.     We are committed to providing you the best care possible. If you receive a survey after visiting one of our offices, please take time to share your experience concerning your physician office visit. These surveys are confidential and no health information about you is shared. We are eager to improve for you and we are counting on your feedback to help make that happen. Please be informed that if you contact our office outside of normal business hours the physician on call cannot help with any scheduling or rescheduling issues, procedure instruction questions or any type of medication issue. We advise you for any urgent/emergency that you go to the nearest emergency room! PLEASE CALL OUR OFFICE DURING NORMAL BUSINESS HOURS    Monday - Friday   8 am to 5 pm    [place]: [address] [place]: [phone]    [place]:  [phone]  Thank you for allowing us to care for you today! We want to ensure we can follow your treatment plan and we strive to give you the best outcomes and experience possible. If you ever have a life threatening emergency and call 911 - for an ambulance (EMS)   Our providers can only care for you at:   [facility] or [facility]. Even if you have someone take you or you drive yourself we can only care for you in a [facility] facility. Our providers are not setup at the other healthcare locations! **It is YOUR responsibilty to bring medication bottles and/or updated medication list to [address].  This will allow us to better serve you and all your healthcare needs**

## 2023-10-02 ENCOUNTER — HOSPITAL ENCOUNTER (OUTPATIENT)
Age: 68
Discharge: HOME | End: 2023-10-02
Payer: COMMERCIAL

## 2023-10-02 ENCOUNTER — HOSPITAL ENCOUNTER (OUTPATIENT)
Dept: GENERAL RADIOLOGY | Age: 68
Discharge: HOME | End: 2023-10-02
Payer: COMMERCIAL

## 2023-10-02 DIAGNOSIS — Z01.810 PRE-OPERATIVE CARDIOVASCULAR EXAMINATION: ICD-10-CM

## 2023-10-02 PROCEDURE — 71046 X-RAY EXAM CHEST 2 VIEWS: CPT

## 2023-10-04 NOTE — H&P
Thank you for involving me in taking care of your  patient Christa Sunshine, who is a  80y.o. year old      male with past medical  history of  Cad, moderate LAD disease for fu on stress  Has been having jaw pain  On the last visit we discussed cardiac catheterization however the patient wanted to hold off now has been having jaw pain and some left shoulder pain and has multiple cardiac risk factors hence we will proceed with the cardiac catheterization           Current Facility-Administered Medications          Current Outpatient Medications   Medication Sig Dispense Refill    metFORMIN, OSM, (FORTAMET) 500 MG extended release tablet Take 1 tablet by mouth in the morning and at bedtime        pravastatin (PRAVACHOL) 20 MG tablet TAKE 1 TABLET BY MOUTH DAILY AT BEDTIME        escitalopram (LEXAPRO) 10 MG tablet daily        aspirin 325 MG tablet Take 1 tablet by mouth daily          No current facility-administered medications for this visit. Allergies: Patient has no known allergies. Past Medical History        Past Medical History:   Diagnosis Date    14 day event monitor 07/26/2018     Nothing significant found. CAD (coronary artery disease) 10/08     Per cardiac cath.     Diabetes mellitus ([address])       Dx 2020    H/O 24 hour EKG monitoring 7/18/12     WNL    H/O cardiac catheterization 10/08     Moderate disease of the distal LAD     H/O cardiovascular stress test 12/2/10     WNL EF 63%    H/O echocardiogram 12/1/09     WNL EF 55%    H/O echocardiogram 04/04/2014     EF 60%, trace mitral and tricuspid insufficiencies, estimated RV systolic pressure is 13.7 mmHg, no pericardial effusion    H/O: depression      History of exercise stress test 08/13/2018     treadmill    Hyperlipidemia      Palpitation      Sleep apnea           Past Surgical History         Past Surgical History:   Procedure Laterality Date    CARPAL TUNNEL RELEASE   3/08    COLONOSCOPY   4-26-13     diverticulosis    COLONOSCOPY

## 2023-10-05 ENCOUNTER — HOSPITAL ENCOUNTER (OUTPATIENT)
Dept: CARDIAC CATH/INVASIVE PROCEDURES | Age: 68
Discharge: HOME | End: 2023-10-05
Attending: INTERNAL MEDICINE | Admitting: INTERNAL MEDICINE
Payer: COMMERCIAL

## 2023-10-05 VITALS
HEIGHT: 71 IN | BODY MASS INDEX: 32.06 KG/M2 | TEMPERATURE: 97.2 F | SYSTOLIC BLOOD PRESSURE: 121 MMHG | WEIGHT: 229 LBS | HEART RATE: 60 BPM | DIASTOLIC BLOOD PRESSURE: 66 MMHG | RESPIRATION RATE: 15 BRPM | OXYGEN SATURATION: 96 %

## 2023-10-05 LAB
ANION GAP SERPL CALCULATED.3IONS-SCNC: 14 MMOL/L (ref 4–16)
APTT: 29.8 SECONDS (ref 25.1–37.1)
BUN SERPL-MCNC: 17 MG/DL (ref 6–23)
CALCIUM SERPL-MCNC: 9.7 MG/DL (ref 8.3–10.6)
CHLORIDE BLD-SCNC: 103 MMOL/L (ref 99–110)
CO2: 23 MMOL/L (ref 21–32)
CREAT SERPL-MCNC: 1.1 MG/DL (ref 0.9–1.3)
GFR SERPL CREATININE-BSD FRML MDRD: >60 ML/MIN/1.73M2
GLUCOSE SERPL-MCNC: 118 MG/DL (ref 70–99)
HCT VFR BLD CALC: 46 % (ref 42–52)
HEMOGLOBIN: 14.7 GM/DL (ref 13.5–18)
INR BLD: 1 INDEX
MCH RBC QN AUTO: 27.3 PG (ref 27–31)
MCHC RBC AUTO-ENTMCNC: 32 % (ref 32–36)
MCV RBC AUTO: 85.3 FL (ref 78–100)
PDW BLD-RTO: 13.9 % (ref 11.7–14.9)
PLATELET # BLD: 317 K/CU MM (ref 140–440)
PMV BLD AUTO: 9.7 FL (ref 7.5–11.1)
POTASSIUM SERPL-SCNC: 4.1 MMOL/L (ref 3.5–5.1)
PROTHROMBIN TIME: 13.3 SECONDS (ref 11.7–14.5)
RBC # BLD: 5.39 M/CU MM (ref 4.6–6.2)
SODIUM BLD-SCNC: 140 MMOL/L (ref 135–145)
WBC # BLD: 10.2 K/CU MM (ref 4–10.5)

## 2023-10-05 PROCEDURE — 93458 L HRT ARTERY/VENTRICLE ANGIO: CPT

## 2023-10-05 PROCEDURE — C1894 INTRO/SHEATH, NON-LASER: HCPCS

## 2023-10-05 PROCEDURE — 2709999900 HC NON-CHARGEABLE SUPPLY

## 2023-10-05 PROCEDURE — 6360000002 HC RX W HCPCS

## 2023-10-05 PROCEDURE — 93454 CORONARY ARTERY ANGIO S&I: CPT | Performed by: INTERNAL MEDICINE

## 2023-10-05 PROCEDURE — 85027 COMPLETE CBC AUTOMATED: CPT

## 2023-10-05 PROCEDURE — 6360000004 HC RX CONTRAST MEDICATION

## 2023-10-05 PROCEDURE — C1769 GUIDE WIRE: HCPCS

## 2023-10-05 PROCEDURE — 80048 BASIC METABOLIC PNL TOTAL CA: CPT

## 2023-10-05 PROCEDURE — 85730 THROMBOPLASTIN TIME PARTIAL: CPT

## 2023-10-05 PROCEDURE — 6370000000 HC RX 637 (ALT 250 FOR IP): Performed by: INTERNAL MEDICINE

## 2023-10-05 PROCEDURE — 85610 PROTHROMBIN TIME: CPT

## 2023-10-05 PROCEDURE — 2500000003 HC RX 250 WO HCPCS

## 2023-10-05 RX ORDER — SODIUM CHLORIDE 0.9 % (FLUSH) 0.9 %
5-40 SYRINGE (ML) INJECTION PRN
Status: DISCONTINUED | OUTPATIENT
Start: 2023-10-05 | End: 2023-10-05 | Stop reason: HOSPADM

## 2023-10-05 RX ORDER — DIPHENHYDRAMINE HCL 25 MG
25 TABLET ORAL ONCE
Status: COMPLETED | OUTPATIENT
Start: 2023-10-05 | End: 2023-10-05

## 2023-10-05 RX ORDER — SODIUM CHLORIDE 0.9 % (FLUSH) 0.9 %
5-40 SYRINGE (ML) INJECTION EVERY 12 HOURS SCHEDULED
Status: DISCONTINUED | OUTPATIENT
Start: 2023-10-05 | End: 2023-10-05 | Stop reason: HOSPADM

## 2023-10-05 RX ORDER — SODIUM CHLORIDE 9 MG/ML
INJECTION, SOLUTION INTRAVENOUS PRN
Status: DISCONTINUED | OUTPATIENT
Start: 2023-10-05 | End: 2023-10-05 | Stop reason: HOSPADM

## 2023-10-05 RX ORDER — SODIUM CHLORIDE 9 MG/ML
INJECTION, SOLUTION INTRAVENOUS CONTINUOUS
Status: DISCONTINUED | OUTPATIENT
Start: 2023-10-05 | End: 2023-10-05 | Stop reason: HOSPADM

## 2023-10-05 RX ORDER — DIAZEPAM 5 MG/1
5 TABLET ORAL ONCE
Status: COMPLETED | OUTPATIENT
Start: 2023-10-05 | End: 2023-10-05

## 2023-10-05 RX ORDER — ACETAMINOPHEN 325 MG/1
650 TABLET ORAL EVERY 4 HOURS PRN
Status: DISCONTINUED | OUTPATIENT
Start: 2023-10-05 | End: 2023-10-05 | Stop reason: HOSPADM

## 2023-10-05 RX ADMIN — DIAZEPAM 5 MG: 5 TABLET ORAL at 06:56

## 2023-10-05 RX ADMIN — DIPHENHYDRAMINE HYDROCHLORIDE 25 MG: 25 TABLET ORAL at 06:56

## 2023-10-05 NOTE — PROGRESS NOTES
To car per wheelchair and home with family. Procedure site remains free from active bleeding and hematoma.

## 2023-12-06 ENCOUNTER — OFFICE VISIT (OUTPATIENT)
Dept: CARDIOLOGY CLINIC | Age: 68
End: 2023-12-06
Payer: COMMERCIAL

## 2023-12-06 VITALS
HEIGHT: 71 IN | HEART RATE: 78 BPM | OXYGEN SATURATION: 95 % | SYSTOLIC BLOOD PRESSURE: 102 MMHG | BODY MASS INDEX: 33.57 KG/M2 | DIASTOLIC BLOOD PRESSURE: 64 MMHG | WEIGHT: 239.8 LBS

## 2023-12-06 DIAGNOSIS — I25.10 ASCVD (ARTERIOSCLEROTIC CARDIOVASCULAR DISEASE): Primary | ICD-10-CM

## 2023-12-06 PROCEDURE — 1123F ACP DISCUSS/DSCN MKR DOCD: CPT | Performed by: INTERNAL MEDICINE

## 2023-12-06 PROCEDURE — 99214 OFFICE O/P EST MOD 30 MIN: CPT | Performed by: INTERNAL MEDICINE

## 2023-12-06 RX ORDER — PANTOPRAZOLE SODIUM 40 MG/1
40 TABLET, DELAYED RELEASE ORAL DAILY
COMMUNITY
Start: 2023-11-20

## 2023-12-06 NOTE — PROGRESS NOTES
CARDIOLOGY NOTE      12/6/2023    RE: Heidi Bazzi  ([birth year])                               TO:  Dr. Cally Aguirre MD      Thank you for involving me in taking care of your  patient Heidi Bazzi, who is a  80y.o. year old      male with past medical  history of  Cad, moderate LAD disease for fu on cath        Current Outpatient Medications   Medication Sig Dispense Refill    pantoprazole (PROTONIX) 40 MG tablet Take 1 tablet by mouth daily      metFORMIN, OSM, (FORTAMET) 500 MG extended release tablet Take 1 tablet by mouth in the morning and at bedtime      pravastatin (PRAVACHOL) 20 MG tablet TAKE 1 TABLET BY MOUTH DAILY AT BEDTIME      escitalopram (LEXAPRO) 10 MG tablet daily      aspirin 325 MG tablet Take 1 tablet by mouth daily       No current facility-administered medications for this visit. Allergies: Patient has no known allergies. Past Medical History:   Diagnosis Date    14 day event monitor 07/26/2018    Nothing significant found. CAD (coronary artery disease) 10/08    Per cardiac cath.     Diabetes mellitus ([address])     Dx 2020    H/O 24 hour EKG monitoring 7/18/12    WNL    H/O cardiac catheterization 10/08    Moderate disease of the distal LAD     H/O cardiovascular stress test 12/2/10    WNL EF 63%    H/O echocardiogram 12/1/09    WNL EF 55%    H/O echocardiogram 04/04/2014    EF 60%, trace mitral and tricuspid insufficiencies, estimated RV systolic pressure is 11.3 mmHg, no pericardial effusion    H/O: depression     History of exercise stress test 08/13/2018    treadmill    Hyperlipidemia     Palpitation     Sleep apnea      Past Surgical History:   Procedure Laterality Date    CARPAL TUNNEL RELEASE  3/08    COLONOSCOPY  4-26-13    diverticulosis    COLONOSCOPY  2013    DIAGNOSTIC CARDIAC CATH LAB PROCEDURE  10/07/08    Moderate disease of the distal LAD    TONSILLECTOMY       Family History   Problem Relation Age of Onset    Cancer Father     Heart Disease Brother

## 2024-05-31 ENCOUNTER — OFFICE VISIT (OUTPATIENT)
Dept: CARDIOLOGY CLINIC | Age: 69
End: 2024-05-31
Payer: COMMERCIAL

## 2024-05-31 VITALS
HEIGHT: 71 IN | BODY MASS INDEX: 34.55 KG/M2 | WEIGHT: 246.8 LBS | SYSTOLIC BLOOD PRESSURE: 122 MMHG | DIASTOLIC BLOOD PRESSURE: 80 MMHG | HEART RATE: 69 BPM

## 2024-05-31 DIAGNOSIS — I25.10 ASCVD (ARTERIOSCLEROTIC CARDIOVASCULAR DISEASE): Primary | ICD-10-CM

## 2024-05-31 DIAGNOSIS — E78.2 MIXED HYPERLIPIDEMIA: ICD-10-CM

## 2024-05-31 PROCEDURE — 1123F ACP DISCUSS/DSCN MKR DOCD: CPT | Performed by: NURSE PRACTITIONER

## 2024-05-31 PROCEDURE — 99214 OFFICE O/P EST MOD 30 MIN: CPT | Performed by: NURSE PRACTITIONER

## 2024-05-31 PROCEDURE — 93000 ELECTROCARDIOGRAM COMPLETE: CPT | Performed by: NURSE PRACTITIONER

## 2024-05-31 RX ORDER — ROSUVASTATIN CALCIUM 40 MG/1
40 TABLET, COATED ORAL DAILY
COMMUNITY

## 2024-05-31 ASSESSMENT — ENCOUNTER SYMPTOMS
SHORTNESS OF BREATH: 0
ORTHOPNEA: 0

## 2024-05-31 NOTE — PROGRESS NOTES
5/31/2024  Primary cardiologist: Dr. [place]    CC:   Kin  is an established 69 y.o.  male here for a follow up on coronary artery disease      SUBJECTIVE/OBJECTIVE:  HPI  Kin is a 69 y.o. male with a history of coronary artery disease, dyslipidemia, diabetes mellitus and sleep apnea  Jooã underwent a cardiac catheterization in October 2021 that showed mild disease of the LAD    Kin reports overall feeling well.  No chest pain or shortness of breath.  States his lipids were recently done with his primary care and medications have been readjusted.  He keeps himself active however is not currently involved in organized exercise.    Review of Systems   Constitutional: Negative for diaphoresis and malaise/fatigue.   Cardiovascular:  Negative for chest pain, claudication, dyspnea on exertion, irregular heartbeat, leg swelling, near-syncope, orthopnea, palpitations and paroxysmal nocturnal dyspnea.   Respiratory:  Negative for shortness of breath.    Neurological:  Negative for dizziness and light-headedness.       Vitals:    05/31/24 0919   BP: 122/80   Site: Left Upper Arm   Position: Sitting   Cuff Size: Large Adult   Pulse: 69   Weight: 111.9 kg (246 lb 12.8 oz)   Height: 1.803 m (5' 11\")     Wt Readings from Last 3 Encounters:   05/31/24 111.9 kg (246 lb 12.8 oz)   12/06/23 108.8 kg (239 lb 12.8 oz)   10/05/23 103.9 kg (229 lb)      Body mass index is 34.42 kg/m².     Physical Exam  Vitals reviewed.   Eyes:      Pupils: Pupils are equal, round, and reactive to light.   Neck:      Vascular: No carotid bruit.   Cardiovascular:      Rate and Rhythm: Normal rate and regular rhythm.      Pulses: Normal pulses.   Pulmonary:      Effort: Pulmonary effort is normal.      Breath sounds: Normal breath sounds.   Musculoskeletal:      Cervical back: No tenderness.      Right lower leg: No edema.      Left lower leg: No edema.   Skin:     General: Skin is warm and dry.      Capillary Refill: Capillary refill takes

## 2024-05-31 NOTE — PATIENT INSTRUCTIONS
We are committed to providing you the best care possible.    If you receive a survey after visiting one of our offices, please take time to share your experience concerning your physician office visit.  These surveys are confidential and no health information about you is shared.    We are eager to improve for you and we are counting on your feedback to help make that happen.      **It is YOUR responsibilty to bring medication bottles and/or updated medication list to EACH APPOINTMENT. This will allow us to better serve you and all your healthcare needs**  Thank you for allowing us to care for you today!   We want to ensure we can follow your treatment plan and we strive to give you the best outcomes and experience possible.   If you ever have a life threatening emergency and call 911 - for an ambulance (EMS)   Our providers can only care for you at:   [facility] or [facility].   Even if you have someone take you or you drive yourself we can only care for you in a [facility] facility. Our providers are not setup at the other healthcare locations!   Please be informed that if you contact our office outside of normal business hours the physician on call cannot help with any scheduling or rescheduling issues, procedure instruction questions or any type of medication issue.    We advise you for any urgent/emergency that you go to the nearest emergency room!    PLEASE CALL OUR OFFICE DURING NORMAL BUSINESS HOURS    Monday - Friday   8 am to 5 pm    [place]: [phone]    [place]: [phone]    [place]:  [phone]

## 2025-05-09 LAB
ALBUMIN: 5.1 G/DL
ALP BLD-CCNC: 102 U/L
ALT SERPL-CCNC: 35 U/L
ANION GAP SERPL CALCULATED.3IONS-SCNC: NORMAL MMOL/L
AST SERPL-CCNC: 32 U/L
BASOPHILS ABSOLUTE: 0.1 /ΜL
BASOPHILS RELATIVE PERCENT: 1 %
BILIRUB SERPL-MCNC: 0.6 MG/DL (ref 0.1–1.4)
BUN BLDV-MCNC: 21 MG/DL
CALCIUM SERPL-MCNC: 10.4 MG/DL
CHLORIDE BLD-SCNC: 102 MMOL/L
CHOLESTEROL, TOTAL: 118 MG/DL
CHOLESTEROL/HDL RATIO: NORMAL
CO2: 23 MMOL/L
CREAT SERPL-MCNC: 1.19 MG/DL
EOSINOPHILS ABSOLUTE: 0.7 /ΜL
EOSINOPHILS RELATIVE PERCENT: 8 %
GFR, ESTIMATED: 66
GLUCOSE BLD-MCNC: 112 MG/DL
HCT VFR BLD CALC: 49.2 % (ref 41–53)
HDLC SERPL-MCNC: 44 MG/DL (ref 35–70)
HEMOGLOBIN: 15.2 G/DL (ref 13.5–17.5)
LDL CHOLESTEROL: 53
LYMPHOCYTES ABSOLUTE: 1.8 /ΜL
LYMPHOCYTES RELATIVE PERCENT: 20 %
MCH RBC QN AUTO: 26.6 PG
MCHC RBC AUTO-ENTMCNC: 30.9 G/DL
MCV RBC AUTO: 86 FL
MONOCYTES ABSOLUTE: 0.5 /ΜL
MONOCYTES RELATIVE PERCENT: 5 %
NEUTROPHILS ABSOLUTE: 5.8 /ΜL
NEUTROPHILS RELATIVE PERCENT: 66 %
NONHDLC SERPL-MCNC: NORMAL MG/DL
PLATELET # BLD: 291 K/ΜL
PMV BLD AUTO: NORMAL FL
POTASSIUM SERPL-SCNC: 4.5 MMOL/L
RBC # BLD: 5.71 10^6/ΜL
SODIUM BLD-SCNC: 141 MMOL/L
TOTAL PROTEIN: 7.7 G/DL (ref 6.4–8.2)
TRIGL SERPL-MCNC: 115 MG/DL
TSH SERPL DL<=0.05 MIU/L-ACNC: 1.07 UIU/ML
VITAMIN D 25-HYDROXY: 107
VITAMIN D2, 25 HYDROXY: NORMAL
VITAMIN D3,25 HYDROXY: NORMAL
VLDLC SERPL CALC-MCNC: 21 MG/DL
WBC # BLD: 8.9 10^3/ML

## 2025-06-12 ENCOUNTER — OFFICE VISIT (OUTPATIENT)
Dept: CARDIOLOGY CLINIC | Age: 70
End: 2025-06-12
Payer: COMMERCIAL

## 2025-06-12 VITALS
HEART RATE: 62 BPM | SYSTOLIC BLOOD PRESSURE: 108 MMHG | BODY MASS INDEX: 30.8 KG/M2 | DIASTOLIC BLOOD PRESSURE: 74 MMHG | HEIGHT: 71 IN | WEIGHT: 220 LBS

## 2025-06-12 DIAGNOSIS — I25.10 ASCVD (ARTERIOSCLEROTIC CARDIOVASCULAR DISEASE): Primary | ICD-10-CM

## 2025-06-12 PROCEDURE — 99214 OFFICE O/P EST MOD 30 MIN: CPT | Performed by: INTERNAL MEDICINE

## 2025-06-12 PROCEDURE — 1123F ACP DISCUSS/DSCN MKR DOCD: CPT | Performed by: INTERNAL MEDICINE

## 2025-06-12 PROCEDURE — 93000 ELECTROCARDIOGRAM COMPLETE: CPT | Performed by: INTERNAL MEDICINE

## 2025-06-12 RX ORDER — TRAZODONE HYDROCHLORIDE 50 MG/1
50 TABLET ORAL NIGHTLY
COMMUNITY

## 2025-06-12 NOTE — PROGRESS NOTES
CARDIOLOGY NOTE      6/12/2025    RE: Kin Bean  (1955)                               TO:  Jay Tineo MD      Thank you for involving me in taking care of your  patient Kin Bean, who is a  70 y.o. year old      male with past medical  history of  Cad, moderate LAD disease for fu         Current Outpatient Medications   Medication Sig Dispense Refill    traZODone (DESYREL) 50 MG tablet Take 1 tablet by mouth nightly      rosuvastatin (CRESTOR) 40 MG tablet Take 1 tablet by mouth daily      pantoprazole (PROTONIX) 40 MG tablet Take 1 tablet by mouth daily      metFORMIN, OSM, (FORTAMET) 500 MG extended release tablet Take 1 tablet by mouth in the morning and at bedtime      escitalopram (LEXAPRO) 10 MG tablet daily      aspirin 325 MG tablet Take 1 tablet by mouth daily       No current facility-administered medications for this visit.     Allergies: Patient has no known allergies.  Past Medical History:   Diagnosis Date    14 day event monitor 07/26/2018    Nothing significant found.    CAD (coronary artery disease) 10/08    Per cardiac cath.    Diabetes mellitus (HCC)     Dx 2020    H/O 24 hour EKG monitoring 7/18/12    WNL    H/O cardiac catheterization 10/08    Moderate disease of the distal LAD     H/O cardiovascular stress test 12/2/10    WNL EF 63%    H/O echocardiogram 12/1/09    WNL EF 55%    H/O echocardiogram 04/04/2014    EF 60%, trace mitral and tricuspid insufficiencies, estimated RV systolic pressure is 30.4 mmHg, no pericardial effusion    H/O: depression     History of exercise stress test 08/13/2018    treadmill    Hyperlipidemia     Palpitation     Sleep apnea      Past Surgical History:   Procedure Laterality Date    CARPAL TUNNEL RELEASE  3/08    COLONOSCOPY  4-26-13    diverticulosis    COLONOSCOPY  2013    DIAGNOSTIC CARDIAC CATH LAB PROCEDURE  10/07/08    Moderate disease of the distal LAD    TONSILLECTOMY       Family History   Problem Relation Age

## 2025-06-12 NOTE — PATIENT INSTRUCTIONS
Thank you for allowing us to care for you today!   We want to ensure we can follow your treatment plan and we strive to give you the best outcomes and experience possible.   If you ever have a life threatening emergency and call 911 - for an ambulance (EMS)  REMEMBER  Our providers can only care for you at:   [facility] or [facility]   Even if you have someone take you or you drive yourself we can only care for you in a [facility] facility. Our providers are not setup at the other healthcare locations!    PLEASE CALL OUR OFFICE DURING NORMAL BUSINESS HOURS  Monday through Friday 8 am to 5 pm  AFTER HOURS the physician on-call cannot help with scheduling, rescheduling, procedure instruction questions or any type of medication need or issue.  [facility] P:[phone] - [facility] P:[phone] - [facility] P:[phone]      If you receive a survey:  We would appreciate you taking the time to share your experience concerning your provider visit in the office.    These surveys are confidential!  We are eager to improve and are counting on you to share your feedback so we can ensure you get the best care possible.

## 2025-06-12 NOTE — PROGRESS NOTES
CLINICAL STAFF DOCUMENTATION    Dr. Lance Bean  1955  [id number]    Have you had any Chest Pain recently? - No  I  Have you had any Shortness of Breath - No    Have you had any dizziness - No  Have you had any palpitations recently? - No  Any thyroid issues? - Yes    Do you have any edema - swelling in No      Is the patient on any of the following medications -   If Yes DO EKG - Needs done every 3 months    When did you have your last labs drawn 2025  What doctor ordered manju  Do we have the labs in their chart No  If we do not have the labs, ask where they were drawn his office    If we do not have these labs, you are retrieve these labs for the provider!    Do you need any prescriptions refilled? - No    Do you have a surgery or procedure scheduled in the near future - No  Do use tobacco products? - No  Do you drink alcohol? - No  Do you use any illicit drugs? - No  Caffeine? - Yes  How much caffeine? .1  viri     Spoke with oj at dr. Walter office he stated he would get them faxed over